# Patient Record
Sex: MALE | Race: WHITE | Employment: UNEMPLOYED | URBAN - METROPOLITAN AREA
[De-identification: names, ages, dates, MRNs, and addresses within clinical notes are randomized per-mention and may not be internally consistent; named-entity substitution may affect disease eponyms.]

---

## 2021-01-01 ENCOUNTER — HOSPITAL ENCOUNTER (EMERGENCY)
Age: 56
Discharge: HOME | End: 2021-01-02
Attending: EMERGENCY MEDICINE
Payer: MEDICAID

## 2021-01-01 VITALS
HEART RATE: 103 BPM | OXYGEN SATURATION: 96 % | HEIGHT: 67 IN | WEIGHT: 198 LBS | RESPIRATION RATE: 20 BRPM | TEMPERATURE: 99.3 F | BODY MASS INDEX: 31.08 KG/M2 | SYSTOLIC BLOOD PRESSURE: 94 MMHG | DIASTOLIC BLOOD PRESSURE: 61 MMHG

## 2021-01-01 DIAGNOSIS — R50.9 FEVER, UNSPECIFIED FEVER CAUSE: Primary | ICD-10-CM

## 2021-01-01 LAB
ANION GAP SERPL CALC-SCNC: 7 MMOL/L (ref 3–18)
APPEARANCE UR: CLEAR
BASOPHILS # BLD: 0 K/UL (ref 0–0.1)
BASOPHILS NFR BLD: 0 % (ref 0–2)
BILIRUB UR QL: NEGATIVE
BUN SERPL-MCNC: 13 MG/DL (ref 7–18)
BUN/CREAT SERPL: 11 (ref 12–20)
CALCIUM SERPL-MCNC: 7.8 MG/DL (ref 8.5–10.1)
CHLORIDE SERPL-SCNC: 97 MMOL/L (ref 100–111)
CO2 SERPL-SCNC: 25 MMOL/L (ref 21–32)
COLOR UR: YELLOW
COVID-19 RAPID TEST, COVR: NOT DETECTED
CREAT SERPL-MCNC: 1.17 MG/DL (ref 0.6–1.3)
DIFFERENTIAL METHOD BLD: ABNORMAL
EOSINOPHIL # BLD: 0.4 K/UL (ref 0–0.4)
EOSINOPHIL NFR BLD: 4 % (ref 0–5)
ERYTHROCYTE [DISTWIDTH] IN BLOOD BY AUTOMATED COUNT: 14 % (ref 11.6–14.5)
GLUCOSE SERPL-MCNC: 314 MG/DL (ref 74–99)
GLUCOSE UR STRIP.AUTO-MCNC: 250 MG/DL
HCT VFR BLD AUTO: 32.6 % (ref 36–48)
HGB BLD-MCNC: 10.3 G/DL (ref 13–16)
HGB UR QL STRIP: NEGATIVE
KETONES UR QL STRIP.AUTO: NEGATIVE MG/DL
LACTATE BLD-SCNC: 2.24 MMOL/L (ref 0.4–2)
LEUKOCYTE ESTERASE UR QL STRIP.AUTO: NEGATIVE
LYMPHOCYTES # BLD: 1.6 K/UL (ref 0.9–3.6)
LYMPHOCYTES NFR BLD: 17 % (ref 21–52)
MAGNESIUM SERPL-MCNC: 2.2 MG/DL (ref 1.6–2.6)
MCH RBC QN AUTO: 28.7 PG (ref 24–34)
MCHC RBC AUTO-ENTMCNC: 31.6 G/DL (ref 31–37)
MCV RBC AUTO: 90.8 FL (ref 74–97)
MONOCYTES # BLD: 0.8 K/UL (ref 0.05–1.2)
MONOCYTES NFR BLD: 8 % (ref 3–10)
NEUTS SEG # BLD: 6.7 K/UL (ref 1.8–8)
NEUTS SEG NFR BLD: 71 % (ref 40–73)
NITRITE UR QL STRIP.AUTO: NEGATIVE
PH UR STRIP: 5.5 [PH] (ref 5–8)
PLATELET # BLD AUTO: 339 K/UL (ref 135–420)
PMV BLD AUTO: 9.5 FL (ref 9.2–11.8)
POTASSIUM SERPL-SCNC: 3.9 MMOL/L (ref 3.5–5.5)
PROT UR STRIP-MCNC: NEGATIVE MG/DL
RBC # BLD AUTO: 3.59 M/UL (ref 4.7–5.5)
SODIUM SERPL-SCNC: 129 MMOL/L (ref 136–145)
SOURCE, COVRS: NORMAL
SP GR UR REFRACTOMETRY: 1.01 (ref 1–1.03)
SPECIMEN TYPE, XMCV1T: NORMAL
UROBILINOGEN UR QL STRIP.AUTO: 0.2 EU/DL (ref 0.2–1)
WBC # BLD AUTO: 9.5 K/UL (ref 4.6–13.2)

## 2021-01-01 PROCEDURE — 83605 ASSAY OF LACTIC ACID: CPT

## 2021-01-01 PROCEDURE — 85025 COMPLETE CBC W/AUTO DIFF WBC: CPT

## 2021-01-01 PROCEDURE — 83735 ASSAY OF MAGNESIUM: CPT

## 2021-01-01 PROCEDURE — 74011250637 HC RX REV CODE- 250/637: Performed by: EMERGENCY MEDICINE

## 2021-01-01 PROCEDURE — 87635 SARS-COV-2 COVID-19 AMP PRB: CPT

## 2021-01-01 PROCEDURE — 87040 BLOOD CULTURE FOR BACTERIA: CPT

## 2021-01-01 PROCEDURE — 74011250636 HC RX REV CODE- 250/636: Performed by: EMERGENCY MEDICINE

## 2021-01-01 PROCEDURE — 81003 URINALYSIS AUTO W/O SCOPE: CPT

## 2021-01-01 PROCEDURE — 80048 BASIC METABOLIC PNL TOTAL CA: CPT

## 2021-01-01 PROCEDURE — 99285 EMERGENCY DEPT VISIT HI MDM: CPT

## 2021-01-01 RX ORDER — OXYCODONE AND ACETAMINOPHEN 5; 325 MG/1; MG/1
1 TABLET ORAL ONCE
Status: COMPLETED | OUTPATIENT
Start: 2021-01-01 | End: 2021-01-01

## 2021-01-01 RX ORDER — ATORVASTATIN CALCIUM 80 MG/1
80 TABLET, FILM COATED ORAL DAILY
COMMUNITY

## 2021-01-01 RX ORDER — QUETIAPINE FUMARATE 400 MG/1
400 TABLET, FILM COATED ORAL EVERY 12 HOURS
COMMUNITY

## 2021-01-01 RX ORDER — GABAPENTIN 800 MG/1
800 TABLET ORAL 4 TIMES DAILY
COMMUNITY

## 2021-01-01 RX ORDER — PANTOPRAZOLE SODIUM 40 MG/1
40 GRANULE, DELAYED RELEASE ORAL 2 TIMES DAILY
COMMUNITY

## 2021-01-01 RX ORDER — ENOXAPARIN SODIUM 100 MG/ML
40 INJECTION SUBCUTANEOUS DAILY
COMMUNITY

## 2021-01-01 RX ORDER — CEFTRIAXONE 1 G/1
2 INJECTION, POWDER, FOR SOLUTION INTRAMUSCULAR; INTRAVENOUS ONCE
COMMUNITY
End: 2021-01-20

## 2021-01-01 RX ORDER — OXYCODONE AND ACETAMINOPHEN 5; 325 MG/1; MG/1
1 TABLET ORAL ONCE
Status: COMPLETED | OUTPATIENT
Start: 2021-01-01 | End: 2021-01-02

## 2021-01-01 RX ORDER — LISINOPRIL 5 MG/1
TABLET ORAL DAILY
COMMUNITY

## 2021-01-01 RX ORDER — FLUOXETINE HYDROCHLORIDE 40 MG/1
80 CAPSULE ORAL DAILY
COMMUNITY

## 2021-01-01 RX ORDER — INSULIN GLARGINE 100 [IU]/ML
45 INJECTION, SOLUTION SUBCUTANEOUS
COMMUNITY
End: 2021-01-20

## 2021-01-01 RX ORDER — INSULIN LISPRO 100 [IU]/ML
6 INJECTION, SOLUTION INTRAVENOUS; SUBCUTANEOUS
COMMUNITY
End: 2021-01-20

## 2021-01-01 RX ORDER — OXYCODONE HYDROCHLORIDE 5 MG/1
10 TABLET ORAL
COMMUNITY
End: 2021-01-20

## 2021-01-01 RX ORDER — DOCUSATE SODIUM 100 MG/1
100 CAPSULE, LIQUID FILLED ORAL
COMMUNITY

## 2021-01-01 RX ADMIN — OXYCODONE HYDROCHLORIDE AND ACETAMINOPHEN 1 TABLET: 5; 325 TABLET ORAL at 20:46

## 2021-01-01 RX ADMIN — SODIUM CHLORIDE 1000 ML: 900 INJECTION, SOLUTION INTRAVENOUS at 19:48

## 2021-01-01 NOTE — ED NOTES
Per Dr Flores Angry- do not initiate sepsis bundle- fluids not indicated- pt already receiving ceftriaxone 2g daily for wound infection treatment
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
## 2021-01-02 PROCEDURE — 74011250637 HC RX REV CODE- 250/637: Performed by: EMERGENCY MEDICINE

## 2021-01-02 RX ADMIN — OXYCODONE HYDROCHLORIDE AND ACETAMINOPHEN 1 TABLET: 5; 325 TABLET ORAL at 00:32

## 2021-01-02 NOTE — DISCHARGE INSTRUCTIONS
Coronavirus test negative. No evidence of urinary tract infection. No wound infection. Blood cultures have been drawn and are pending at [facility].   No leukocytosis

## 2021-01-07 LAB
BACTERIA SPEC CULT: NORMAL
BACTERIA SPEC CULT: NORMAL
SERVICE CMNT-IMP: NORMAL
SERVICE CMNT-IMP: NORMAL

## 2021-01-09 ENCOUNTER — APPOINTMENT (OUTPATIENT)
Dept: CT IMAGING | Age: 56
DRG: 721 | End: 2021-01-09
Attending: STUDENT IN AN ORGANIZED HEALTH CARE EDUCATION/TRAINING PROGRAM
Payer: MEDICAID

## 2021-01-09 ENCOUNTER — APPOINTMENT (OUTPATIENT)
Dept: GENERAL RADIOLOGY | Age: 56
DRG: 721 | End: 2021-01-09
Attending: STUDENT IN AN ORGANIZED HEALTH CARE EDUCATION/TRAINING PROGRAM
Payer: MEDICAID

## 2021-01-09 ENCOUNTER — HOSPITAL ENCOUNTER (INPATIENT)
Age: 56
LOS: 11 days | Discharge: NURSING, REHAB OR LONG-TERM CARE | DRG: 721 | End: 2021-01-20
Attending: STUDENT IN AN ORGANIZED HEALTH CARE EDUCATION/TRAINING PROGRAM | Admitting: HOSPITALIST
Payer: MEDICAID

## 2021-01-09 DIAGNOSIS — A41.9 SEPSIS WITH ACUTE RENAL FAILURE, DUE TO UNSPECIFIED ORGANISM, UNSPECIFIED ACUTE RENAL FAILURE TYPE, UNSPECIFIED WHETHER SEPTIC SHOCK PRESENT (HCC): Primary | ICD-10-CM

## 2021-01-09 DIAGNOSIS — R65.20 SEPSIS WITH ACUTE RENAL FAILURE, DUE TO UNSPECIFIED ORGANISM, UNSPECIFIED ACUTE RENAL FAILURE TYPE, UNSPECIFIED WHETHER SEPTIC SHOCK PRESENT (HCC): Primary | ICD-10-CM

## 2021-01-09 DIAGNOSIS — N17.9 SEPSIS WITH ACUTE RENAL FAILURE, DUE TO UNSPECIFIED ORGANISM, UNSPECIFIED ACUTE RENAL FAILURE TYPE, UNSPECIFIED WHETHER SEPTIC SHOCK PRESENT (HCC): Primary | ICD-10-CM

## 2021-01-09 DIAGNOSIS — T84.7XXD INFECTED HARDWARE IN RIGHT LOWER EXTREMITY, SUBSEQUENT ENCOUNTER: ICD-10-CM

## 2021-01-09 PROBLEM — E87.6 HYPOKALEMIA: Status: ACTIVE | Noted: 2021-01-09

## 2021-01-09 PROBLEM — T80.219A PICC LINE INFECTION: Status: ACTIVE | Noted: 2021-01-09

## 2021-01-09 PROBLEM — E83.42 HYPOMAGNESEMIA: Status: ACTIVE | Noted: 2021-01-09

## 2021-01-09 PROBLEM — R65.21 SEPTIC SHOCK (HCC): Status: ACTIVE | Noted: 2021-01-09

## 2021-01-09 LAB
ANION GAP SERPL CALC-SCNC: 11 MMOL/L (ref 3–18)
APPEARANCE UR: CLEAR
APTT PPP: 46.4 SEC (ref 23–36.4)
ARTERIAL PATENCY WRIST A: NO
ATRIAL RATE: 132 BPM
BASE DEFICIT BLD-SCNC: 6 MMOL/L
BASOPHILS # BLD: 0 K/UL (ref 0–0.1)
BASOPHILS NFR BLD: 0 % (ref 0–2)
BDY SITE: ABNORMAL
BILIRUB UR QL: NEGATIVE
BODY TEMPERATURE: 102.2
BUN SERPL-MCNC: 15 MG/DL (ref 7–18)
BUN/CREAT SERPL: 6 (ref 12–20)
CALCIUM SERPL-MCNC: 8.6 MG/DL (ref 8.5–10.1)
CALCULATED P AXIS, ECG09: 55 DEGREES
CALCULATED R AXIS, ECG10: 16 DEGREES
CALCULATED T AXIS, ECG11: 50 DEGREES
CHLORIDE SERPL-SCNC: 100 MMOL/L (ref 100–111)
CK MB CFR SERPL CALC: ABNORMAL % (ref 0–4)
CK MB SERPL-MCNC: <1 NG/ML (ref 5–25)
CK SERPL-CCNC: 35 U/L (ref 39–308)
CO2 SERPL-SCNC: 24 MMOL/L (ref 21–32)
COLOR UR: YELLOW
COVID-19 RAPID TEST, COVR: NOT DETECTED
CREAT SERPL-MCNC: 2.49 MG/DL (ref 0.6–1.3)
DIAGNOSIS, 93000: NORMAL
DIFFERENTIAL METHOD BLD: ABNORMAL
EOSINOPHIL # BLD: 0 K/UL (ref 0–0.4)
EOSINOPHIL NFR BLD: 1 % (ref 0–5)
ERYTHROCYTE [DISTWIDTH] IN BLOOD BY AUTOMATED COUNT: 14.4 % (ref 11.6–14.5)
EST. AVERAGE GLUCOSE BLD GHB EST-MCNC: 180 MG/DL
FLUAV AG NPH QL IA: NEGATIVE
FLUBV AG NOSE QL IA: NEGATIVE
GAS FLOW.O2 O2 DELIVERY SYS: ABNORMAL L/MIN
GAS FLOW.O2 SETTING OXYMISER: 3.5 L/M
GLUCOSE BLD STRIP.AUTO-MCNC: 181 MG/DL (ref 70–110)
GLUCOSE SERPL-MCNC: 161 MG/DL (ref 74–99)
GLUCOSE UR STRIP.AUTO-MCNC: NEGATIVE MG/DL
HBA1C MFR BLD: 7.9 % (ref 4.2–5.6)
HCO3 BLD-SCNC: 18.2 MMOL/L (ref 22–26)
HCT VFR BLD AUTO: 36.2 % (ref 36–48)
HEALTH STATUS, XMCV2T: NORMAL
HGB BLD-MCNC: 11.1 G/DL (ref 13–16)
HGB UR QL STRIP: NEGATIVE
INR PPP: 1.6 (ref 0.8–1.2)
KETONES UR QL STRIP.AUTO: NEGATIVE MG/DL
LACTATE BLD-SCNC: 2.37 MMOL/L (ref 0.4–2)
LACTATE BLD-SCNC: 2.9 MMOL/L (ref 0.4–2)
LACTATE BLD-SCNC: 6.21 MMOL/L (ref 0.4–2)
LEUKOCYTE ESTERASE UR QL STRIP.AUTO: NEGATIVE
LYMPHOCYTES # BLD: 0.2 K/UL (ref 0.9–3.6)
LYMPHOCYTES NFR BLD: 7 % (ref 21–52)
MAGNESIUM SERPL-MCNC: 1.7 MG/DL (ref 1.6–2.6)
MCH RBC QN AUTO: 27.5 PG (ref 24–34)
MCHC RBC AUTO-ENTMCNC: 30.7 G/DL (ref 31–37)
MCV RBC AUTO: 89.8 FL (ref 74–97)
MONOCYTES # BLD: 0 K/UL (ref 0.05–1.2)
MONOCYTES NFR BLD: 0 % (ref 3–10)
NEUTS SEG # BLD: 2.9 K/UL (ref 1.8–8)
NEUTS SEG NFR BLD: 92 % (ref 40–73)
NITRITE UR QL STRIP.AUTO: NEGATIVE
O2/TOTAL GAS SETTING VFR VENT: 34 %
P-R INTERVAL, ECG05: 122 MS
PCO2 BLD: 30.6 MMHG (ref 35–45)
PH BLD: 7.39 [PH] (ref 7.35–7.45)
PH UR STRIP: 5.5 [PH] (ref 5–8)
PHOSPHATE SERPL-MCNC: 1 MG/DL (ref 2.5–4.9)
PLATELET # BLD AUTO: 231 K/UL (ref 135–420)
PMV BLD AUTO: 9 FL (ref 9.2–11.8)
PO2 BLD: 67 MMHG (ref 80–100)
POTASSIUM SERPL-SCNC: 3.8 MMOL/L (ref 3.5–5.5)
PROT UR STRIP-MCNC: NEGATIVE MG/DL
PROTHROMBIN TIME: 18.3 SEC (ref 11.5–15.2)
Q-T INTERVAL, ECG07: 304 MS
QRS DURATION, ECG06: 80 MS
QTC CALCULATION (BEZET), ECG08: 450 MS
RBC # BLD AUTO: 4.03 M/UL (ref 4.7–5.5)
SAO2 % BLD: 91 % (ref 92–97)
SERVICE CMNT-IMP: ABNORMAL
SODIUM SERPL-SCNC: 135 MMOL/L (ref 136–145)
SOURCE, COVRS: NORMAL
SP GR UR REFRACTOMETRY: 1.01 (ref 1–1.03)
SPECIMEN TYPE, XMCV1T: NORMAL
SPECIMEN TYPE: ABNORMAL
TOTAL RESP. RATE, ITRR: 24
TROPONIN I SERPL-MCNC: <0.02 NG/ML (ref 0–0.04)
UROBILINOGEN UR QL STRIP.AUTO: 0.2 EU/DL (ref 0.2–1)
VENTRICULAR RATE, ECG03: 132 BPM
WBC # BLD AUTO: 3.2 K/UL (ref 4.6–13.2)

## 2021-01-09 PROCEDURE — 65610000006 HC RM INTENSIVE CARE

## 2021-01-09 PROCEDURE — 96368 THER/DIAG CONCURRENT INF: CPT

## 2021-01-09 PROCEDURE — 71045 X-RAY EXAM CHEST 1 VIEW: CPT

## 2021-01-09 PROCEDURE — 87106 FUNGI IDENTIFICATION YEAST: CPT

## 2021-01-09 PROCEDURE — 81003 URINALYSIS AUTO W/O SCOPE: CPT

## 2021-01-09 PROCEDURE — 74011636637 HC RX REV CODE- 636/637: Performed by: HOSPITALIST

## 2021-01-09 PROCEDURE — 74011000258 HC RX REV CODE- 258: Performed by: HOSPITALIST

## 2021-01-09 PROCEDURE — 74011250636 HC RX REV CODE- 250/636: Performed by: STUDENT IN AN ORGANIZED HEALTH CARE EDUCATION/TRAINING PROGRAM

## 2021-01-09 PROCEDURE — 74011000250 HC RX REV CODE- 250: Performed by: HOSPITALIST

## 2021-01-09 PROCEDURE — 85025 COMPLETE CBC W/AUTO DIFF WBC: CPT

## 2021-01-09 PROCEDURE — 87040 BLOOD CULTURE FOR BACTERIA: CPT

## 2021-01-09 PROCEDURE — 73700 CT LOWER EXTREMITY W/O DYE: CPT

## 2021-01-09 PROCEDURE — 36600 WITHDRAWAL OF ARTERIAL BLOOD: CPT

## 2021-01-09 PROCEDURE — 83605 ASSAY OF LACTIC ACID: CPT

## 2021-01-09 PROCEDURE — 96365 THER/PROPH/DIAG IV INF INIT: CPT

## 2021-01-09 PROCEDURE — 85730 THROMBOPLASTIN TIME PARTIAL: CPT

## 2021-01-09 PROCEDURE — 74011000258 HC RX REV CODE- 258: Performed by: STUDENT IN AN ORGANIZED HEALTH CARE EDUCATION/TRAINING PROGRAM

## 2021-01-09 PROCEDURE — 99285 EMERGENCY DEPT VISIT HI MDM: CPT

## 2021-01-09 PROCEDURE — 74011250636 HC RX REV CODE- 250/636: Performed by: HOSPITALIST

## 2021-01-09 PROCEDURE — 87635 SARS-COV-2 COVID-19 AMP PRB: CPT

## 2021-01-09 PROCEDURE — 96366 THER/PROPH/DIAG IV INF ADDON: CPT

## 2021-01-09 PROCEDURE — 83036 HEMOGLOBIN GLYCOSYLATED A1C: CPT

## 2021-01-09 PROCEDURE — 80048 BASIC METABOLIC PNL TOTAL CA: CPT

## 2021-01-09 PROCEDURE — 87070 CULTURE OTHR SPECIMN AEROBIC: CPT

## 2021-01-09 PROCEDURE — 82803 BLOOD GASES ANY COMBINATION: CPT

## 2021-01-09 PROCEDURE — 87804 INFLUENZA ASSAY W/OPTIC: CPT

## 2021-01-09 PROCEDURE — 83735 ASSAY OF MAGNESIUM: CPT

## 2021-01-09 PROCEDURE — 74011250636 HC RX REV CODE- 250/636: Performed by: INTERNAL MEDICINE

## 2021-01-09 PROCEDURE — 84484 ASSAY OF TROPONIN QUANT: CPT

## 2021-01-09 PROCEDURE — 85610 PROTHROMBIN TIME: CPT

## 2021-01-09 PROCEDURE — 51702 INSERT TEMP BLADDER CATH: CPT

## 2021-01-09 PROCEDURE — 74011000250 HC RX REV CODE- 250: Performed by: STUDENT IN AN ORGANIZED HEALTH CARE EDUCATION/TRAINING PROGRAM

## 2021-01-09 PROCEDURE — 74011250637 HC RX REV CODE- 250/637: Performed by: HOSPITALIST

## 2021-01-09 PROCEDURE — 82962 GLUCOSE BLOOD TEST: CPT

## 2021-01-09 PROCEDURE — 84100 ASSAY OF PHOSPHORUS: CPT

## 2021-01-09 PROCEDURE — 93005 ELECTROCARDIOGRAM TRACING: CPT

## 2021-01-09 RX ORDER — ACETAMINOPHEN 325 MG/1
650 TABLET ORAL
Status: DISCONTINUED | OUTPATIENT
Start: 2021-01-09 | End: 2021-01-20 | Stop reason: HOSPADM

## 2021-01-09 RX ORDER — INSULIN LISPRO 100 [IU]/ML
INJECTION, SOLUTION INTRAVENOUS; SUBCUTANEOUS
COMMUNITY
End: 2021-01-20

## 2021-01-09 RX ORDER — ACETAMINOPHEN 650 MG/1
650 SUPPOSITORY RECTAL
Status: DISCONTINUED | OUTPATIENT
Start: 2021-01-09 | End: 2021-01-20 | Stop reason: HOSPADM

## 2021-01-09 RX ORDER — INSULIN GLARGINE 100 [IU]/ML
20 INJECTION, SOLUTION SUBCUTANEOUS
Status: DISCONTINUED | OUTPATIENT
Start: 2021-01-09 | End: 2021-01-11

## 2021-01-09 RX ORDER — SODIUM CHLORIDE 9 MG/ML
150 INJECTION, SOLUTION INTRAVENOUS CONTINUOUS
Status: DISCONTINUED | OUTPATIENT
Start: 2021-01-09 | End: 2021-01-11

## 2021-01-09 RX ORDER — VANCOMYCIN 2 GRAM/500 ML IN 0.9 % SODIUM CHLORIDE INTRAVENOUS
2000 ONCE
Status: COMPLETED | OUTPATIENT
Start: 2021-01-09 | End: 2021-01-09

## 2021-01-09 RX ORDER — SODIUM CHLORIDE 0.9 % (FLUSH) 0.9 %
5-40 SYRINGE (ML) INJECTION AS NEEDED
Status: DISCONTINUED | OUTPATIENT
Start: 2021-01-09 | End: 2021-01-20 | Stop reason: HOSPADM

## 2021-01-09 RX ORDER — ACETAMINOPHEN 325 MG/1
650 TABLET ORAL
COMMUNITY

## 2021-01-09 RX ORDER — QUETIAPINE FUMARATE 100 MG/1
TABLET, FILM COATED ORAL
Status: DISPENSED
Start: 2021-01-09 | End: 2021-01-10

## 2021-01-09 RX ORDER — POTASSIUM CHLORIDE 750 MG/1
40 TABLET, FILM COATED, EXTENDED RELEASE ORAL
Status: DISCONTINUED | OUTPATIENT
Start: 2021-01-09 | End: 2021-01-09

## 2021-01-09 RX ORDER — SODIUM CHLORIDE 0.9 % (FLUSH) 0.9 %
5-40 SYRINGE (ML) INJECTION EVERY 8 HOURS
Status: DISCONTINUED | OUTPATIENT
Start: 2021-01-09 | End: 2021-01-20 | Stop reason: HOSPADM

## 2021-01-09 RX ORDER — ONDANSETRON 2 MG/ML
4 INJECTION INTRAMUSCULAR; INTRAVENOUS
Status: DISCONTINUED | OUTPATIENT
Start: 2021-01-09 | End: 2021-01-20 | Stop reason: HOSPADM

## 2021-01-09 RX ORDER — ENOXAPARIN SODIUM 100 MG/ML
40 INJECTION SUBCUTANEOUS DAILY
Status: DISCONTINUED | OUTPATIENT
Start: 2021-01-10 | End: 2021-01-10

## 2021-01-09 RX ORDER — SODIUM CHLORIDE 0.9 % (FLUSH) 0.9 %
5-10 SYRINGE (ML) INJECTION AS NEEDED
Status: DISCONTINUED | OUTPATIENT
Start: 2021-01-09 | End: 2021-01-11 | Stop reason: SDUPTHER

## 2021-01-09 RX ORDER — POLYETHYLENE GLYCOL 3350 17 G/17G
17 POWDER, FOR SOLUTION ORAL DAILY PRN
Status: DISCONTINUED | OUTPATIENT
Start: 2021-01-09 | End: 2021-01-20 | Stop reason: HOSPADM

## 2021-01-09 RX ORDER — FLUOXETINE HYDROCHLORIDE 20 MG/1
80 CAPSULE ORAL DAILY
Status: DISCONTINUED | OUTPATIENT
Start: 2021-01-10 | End: 2021-01-20 | Stop reason: HOSPADM

## 2021-01-09 RX ORDER — NOREPINEPHRINE BIT/0.9 % NACL 8 MG/250ML
.5-16 INFUSION BOTTLE (ML) INTRAVENOUS
Status: DISCONTINUED | OUTPATIENT
Start: 2021-01-09 | End: 2021-01-12

## 2021-01-09 RX ORDER — PROMETHAZINE HYDROCHLORIDE 25 MG/1
12.5 TABLET ORAL
Status: DISCONTINUED | OUTPATIENT
Start: 2021-01-09 | End: 2021-01-20 | Stop reason: HOSPADM

## 2021-01-09 RX ORDER — MAGNESIUM SULFATE 100 %
4 CRYSTALS MISCELLANEOUS AS NEEDED
Status: DISCONTINUED | OUTPATIENT
Start: 2021-01-09 | End: 2021-01-20 | Stop reason: HOSPADM

## 2021-01-09 RX ORDER — INSULIN GLARGINE 100 [IU]/ML
10 INJECTION, SOLUTION SUBCUTANEOUS
Status: DISCONTINUED | OUTPATIENT
Start: 2021-01-09 | End: 2021-01-09

## 2021-01-09 RX ORDER — QUETIAPINE FUMARATE 200 MG/1
400 TABLET, FILM COATED ORAL EVERY 12 HOURS
Status: DISCONTINUED | OUTPATIENT
Start: 2021-01-09 | End: 2021-01-14

## 2021-01-09 RX ORDER — MAGNESIUM SULFATE HEPTAHYDRATE 40 MG/ML
2 INJECTION, SOLUTION INTRAVENOUS ONCE
Status: COMPLETED | OUTPATIENT
Start: 2021-01-09 | End: 2021-01-09

## 2021-01-09 RX ORDER — INSULIN LISPRO 100 [IU]/ML
INJECTION, SOLUTION INTRAVENOUS; SUBCUTANEOUS
Status: DISCONTINUED | OUTPATIENT
Start: 2021-01-09 | End: 2021-01-20 | Stop reason: HOSPADM

## 2021-01-09 RX ORDER — MAGNESIUM SULFATE HEPTAHYDRATE 500 MG/ML
2 INJECTION, SOLUTION INTRAMUSCULAR; INTRAVENOUS
Status: DISCONTINUED | OUTPATIENT
Start: 2021-01-09 | End: 2021-01-09

## 2021-01-09 RX ORDER — DEXTROSE MONOHYDRATE 25 G/50ML
25-50 INJECTION, SOLUTION INTRAVENOUS AS NEEDED
Status: DISCONTINUED | OUTPATIENT
Start: 2021-01-09 | End: 2021-01-20 | Stop reason: HOSPADM

## 2021-01-09 RX ADMIN — VANCOMYCIN HYDROCHLORIDE 2000 MG: 10 INJECTION, POWDER, LYOPHILIZED, FOR SOLUTION INTRAVENOUS at 11:15

## 2021-01-09 RX ADMIN — SODIUM CHLORIDE 694 ML: 900 INJECTION, SOLUTION INTRAVENOUS at 11:24

## 2021-01-09 RX ADMIN — VASOPRESSIN 0.04 UNITS/MIN: 20 INJECTION INTRAVENOUS at 23:56

## 2021-01-09 RX ADMIN — ACETAMINOPHEN 650 MG: 325 TABLET, FILM COATED ORAL at 19:54

## 2021-01-09 RX ADMIN — NOREPINEPHRINE BITARTRATE 16 MCG/MIN: 1 INJECTION, SOLUTION, CONCENTRATE INTRAVENOUS at 19:48

## 2021-01-09 RX ADMIN — SODIUM CHLORIDE 1000 ML: 900 INJECTION, SOLUTION INTRAVENOUS at 10:52

## 2021-01-09 RX ADMIN — CEFEPIME 2 G: 2 INJECTION, POWDER, FOR SOLUTION INTRAVENOUS at 22:06

## 2021-01-09 RX ADMIN — SODIUM CHLORIDE 1000 ML: 900 INJECTION, SOLUTION INTRAVENOUS at 11:20

## 2021-01-09 RX ADMIN — SODIUM CHLORIDE 150 ML/HR: 900 INJECTION, SOLUTION INTRAVENOUS at 19:45

## 2021-01-09 RX ADMIN — SODIUM CHLORIDE 1000 ML: 900 INJECTION, SOLUTION INTRAVENOUS at 10:50

## 2021-01-09 RX ADMIN — VASOPRESSIN 0.04 UNITS/MIN: 20 INJECTION INTRAVENOUS at 16:55

## 2021-01-09 RX ADMIN — NOREPINEPHRINE BITARTRATE 8 MCG/MIN: 1 INJECTION, SOLUTION, CONCENTRATE INTRAVENOUS at 12:36

## 2021-01-09 RX ADMIN — SODIUM CHLORIDE 10 ML: 9 INJECTION, SOLUTION INTRAMUSCULAR; INTRAVENOUS; SUBCUTANEOUS at 22:11

## 2021-01-09 RX ADMIN — QUETIAPINE FUMARATE 400 MG: 200 TABLET ORAL at 22:30

## 2021-01-09 RX ADMIN — CEFEPIME 2 G: 2 INJECTION, POWDER, FOR SOLUTION INTRAVENOUS at 11:09

## 2021-01-09 RX ADMIN — MAGNESIUM SULFATE HEPTAHYDRATE 2 G: 40 INJECTION, SOLUTION INTRAVENOUS at 17:50

## 2021-01-09 NOTE — ED NOTES
Spoke with Dr Dana Figueroa, states Dr Antonina Frank (intensivist) will drive back to the hospital to replace pt's central line. Once the central line is replaced, Dr Dana Figueroa would like the patient to be placed in 2 point soft wrist restraints in the interest of pt safety. He also asked me to speak with the pharmacy regarding the infusion rates of his pressors as they are temporarily being run through [facility]. States rates may need to be decreased in the interim. Will communicate this to oncoming RN.

## 2021-01-09 NOTE — ED NOTES
Dr. Santos Roles notes -I assumed care of this patient from Dr. Jenae Fried at 3 PM -patient pending CT scan of the lower extremity to evaluate for osteomyelitis. Patient had the hardware removed from his leg during admission at [facility].      Recent Results (from the past 12 hour(s))   CBC WITH AUTOMATED DIFF    Collection Time: 01/09/21 10:45 AM   Result Value Ref Range    WBC 3.2 (L) 4.6 - 13.2 K/uL    RBC 4.03 (L) 4.70 - 5.50 M/uL    HGB 11.1 (L) 13.0 - 16.0 g/dL    HCT 36.2 36.0 - 48.0 %    MCV 89.8 74.0 - 97.0 FL    MCH 27.5 24.0 - 34.0 PG    MCHC 30.7 (L) 31.0 - 37.0 g/dL    RDW 14.4 11.6 - 14.5 %    PLATELET 844 115 - 524 K/uL    MPV 9.0 (L) 9.2 - 11.8 FL    NEUTROPHILS 92 (H) 40 - 73 %    LYMPHOCYTES 7 (L) 21 - 52 %    MONOCYTES 0 (L) 3 - 10 %    EOSINOPHILS 1 0 - 5 %    BASOPHILS 0 0 - 2 %    ABS. NEUTROPHILS 2.9 1.8 - 8.0 K/UL    ABS. LYMPHOCYTES 0.2 (L) 0.9 - 3.6 K/UL    ABS. MONOCYTES 0.0 (L) 0.05 - 1.2 K/UL    ABS. EOSINOPHILS 0.0 0.0 - 0.4 K/UL    ABS.  BASOPHILS 0.0 0.0 - 0.1 K/UL    DF AUTOMATED     METABOLIC PANEL, BASIC    Collection Time: 01/09/21 10:45 AM   Result Value Ref Range    Sodium 135 (L) 136 - 145 mmol/L    Potassium 3.8 3.5 - 5.5 mmol/L    Chloride 100 100 - 111 mmol/L    CO2 24 21 - 32 mmol/L    Anion gap 11 3.0 - 18 mmol/L    Glucose 161 (H) 74 - 99 mg/dL    BUN 15 7.0 - 18 MG/DL    Creatinine 2.49 (H) 0.6 - 1.3 MG/DL    BUN/Creatinine ratio 6 (L) 12 - 20      GFR est AA 33 (L) >60 ml/min/1.73m2    GFR est non-AA 27 (L) >60 ml/min/1.73m2    Calcium 8.6 8.5 - 10.1 MG/DL   CARDIAC PANEL,(CK, CKMB & TROPONIN)    Collection Time: 01/09/21 10:45 AM   Result Value Ref Range    CK - MB <1.0 <3.6 ng/ml    CK-MB Index  0.0 - 4.0 %     CALCULATION NOT PERFORMED WHEN RESULT IS BELOW LINEAR LIMIT    CK 35 (L) 39 - 308 U/L    Troponin-I, QT <0.02 0.0 - 0.045 NG/ML   MAGNESIUM    Collection Time: 01/09/21 10:45 AM   Result Value Ref Range    Magnesium 1.7 1.6 - 2.6 mg/dL   PHOSPHORUS    Collection Time: 01/09/21 10:45 AM   Result Value Ref Range    Phosphorus 1.0 (L) 2.5 - 4.9 MG/DL   PROTHROMBIN TIME + INR    Collection Time: 01/09/21 10:45 AM   Result Value Ref Range    Prothrombin time 18.3 (H) 11.5 - 15.2 sec    INR 1.6 (H) 0.8 - 1.2     PTT    Collection Time: 01/09/21 10:45 AM   Result Value Ref Range    aPTT 46.4 (H) 23.0 - 36.4 SEC   POC LACTIC ACID    Collection Time: 01/09/21 10:46 AM   Result Value Ref Range    Lactic Acid (POC) 6.21 (HH) 0.40 - 2.00 mmol/L   EKG, 12 LEAD, INITIAL    Collection Time: 01/09/21 10:57 AM   Result Value Ref Range    Ventricular Rate 132 BPM    Atrial Rate 132 BPM    P-R Interval 122 ms    QRS Duration 80 ms    Q-T Interval 304 ms    QTC Calculation (Bezet) 450 ms    Calculated P Axis 55 degrees    Calculated R Axis 16 degrees    Calculated T Axis 50 degrees    Diagnosis       Sinus tachycardia  Otherwise normal ECG  No previous ECGs available     SARS-COV-2    Collection Time: 01/09/21 11:30 AM   Result Value Ref Range    Specimen source NP Swab      COVID-19 rapid test Not detected NOTD      Specimen type NP Swab      Health status NP Swab     INFLUENZA A & B AG (RAPID TEST)    Collection Time: 01/09/21 11:30 AM   Result Value Ref Range    Influenza A Antigen Negative NEG      Influenza B Antigen Negative NEG     URINALYSIS W/ RFLX MICROSCOPIC    Collection Time: 01/09/21  1:56 PM   Result Value Ref Range    Color YELLOW      Appearance CLEAR      Specific gravity 1.013 1.005 - 1.030      pH (UA) 5.5 5.0 - 8.0      Protein Negative NEG mg/dL    Glucose Negative NEG mg/dL    Ketone Negative NEG mg/dL    Bilirubin Negative NEG      Blood Negative NEG      Urobilinogen 0.2 0.2 - 1.0 EU/dL    Nitrites Negative NEG      Leukocyte Esterase Negative NEG     POC LACTIC ACID    Collection Time: 01/09/21  3:44 PM   Result Value Ref Range    Lactic Acid (POC) 2.90 (HH) 0.40 - 2.00 mmol/L     XR CHEST PORT   Final Result   IMPRESSION:      No active cardiopulmonary disease. CT LOW EXT RT WO CONT    (Results Pending) Preliminary interpretation by radiology resident -no CT evidence of osteomyelitis noted. Diagnosis:   1. Sepsis with acute renal failure, due to unspecified organism, unspecified acute renal failure type, unspecified whether septic shock present [facility])          Disposition: Admitted    Follow-up Information    None         Patient's Medications   Start Taking    No medications on file   Continue Taking    ACETAMINOPHEN (TYLENOL) 325 MG TABLET    Take 650 mg by mouth every six (6) hours as needed for Pain. ATORVASTATIN (LIPITOR) 80 MG TABLET    Take 80 mg by mouth daily. CEFTRIAXONE (ROCEPHIN) 1 GRAM INJECTION    2 g by IntraVENous route once. IV once a day for wound infection until 1/15/21    DOCUSATE SODIUM (COLACE) 100 MG CAPSULE    Take 100 mg by mouth nightly. ENOXAPARIN (LOVENOX) 40 MG/0.4 ML    40 mg by SubCUTAneous route daily. FLUOXETINE (PROZAC) 40 MG CAPSULE    Take 80 mg by mouth daily. GABAPENTIN (NEURONTIN) 800 MG TABLET    Take 800 mg by mouth four (4) times daily. INSULIN GLARGINE (LANTUS SOLOSTAR U-100 INSULIN) 100 UNIT/ML (3 ML) INPN    45 Units by SubCUTAneous route ACB/HS. INSULIN LISPRO (HUMALOG) 100 UNIT/ML INJECTION    by SubCUTAneous route. SLIDING SCALE    INSULIN LISPRO (HUMALOG) 100 UNIT/ML KWIKPEN    6 Units by SubCUTAneous route Before breakfast, lunch, and dinner. LISINOPRIL (PRINIVIL, ZESTRIL) 5 MG TABLET    Take  by mouth daily. OXYCODONE IR (ROXICODONE) 5 MG IMMEDIATE RELEASE TABLET    Take 10 mg by mouth every six (6) hours as needed for Pain. PANTOPRAZOLE (PROTONIX) 40 MG GRANULES FOR ORAL SUSPENSION    Take 40 mg by mouth two (2) times a day. QUETIAPINE (SEROQUEL) 400 MG TABLET    Take 400 mg by mouth every twelve (12) hours.    These Medications have changed    No medications on file   Stop Taking    No medications on file

## 2021-01-09 NOTE — ED NOTES
Patient's MAP still in the upper 50's and low 60's, Dr Krissy Pino aware, will start 2030 [address]
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
## 2021-01-09 NOTE — ED NOTES
Spoke with Dr. Christian Forrest. States central line needs to be replaced. States he will speak with Dr Jefferson Dixon regarding this.

## 2021-01-09 NOTE — ED NOTES
Spoke with Dr. Chari Miller. States to pull pt's PICC line and send for cultures. Dr Chari Miller was made aware of pt's worsening condition and MAP trending down significantly. States he will order additional meds.
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
## 2021-01-09 NOTE — ED NOTES
Pt was seen on camera getting out of bed. Immediately went to pt's room. Pt has pulled central line with pressors infusing and sitting on trash can defecating. Pt was assisted back to bed. Reconnected to monitors. Pressors connected to peripheral lines. MD paged to make aware of incident and displaced central line.

## 2021-01-09 NOTE — ED TRIAGE NOTES
Assume care of patient, patient was sent in from the [facility], patient here for possible Midline infection, left arm warm to touch and red where Mid Line is, fever, on arrival patient with tachycardia, tachypnea , SOB and fever, patient placed on monitor, ST, breathing 28 breaths/minute, patient placed on droplet precautions, patient is alert and oriented x 4, patients skin hot to touch and dry, normal color, O2 SAT 89% on room air, patient placed on 2L NC and SAT up to 95-98, Patient states that his Covid test at the [facility] was NEGATIVE

## 2021-01-10 ENCOUNTER — APPOINTMENT (OUTPATIENT)
Dept: VASCULAR SURGERY | Age: 56
DRG: 721 | End: 2021-01-10
Attending: HOSPITALIST
Payer: MEDICAID

## 2021-01-10 LAB
ANION GAP SERPL CALC-SCNC: 11 MMOL/L (ref 3–18)
APPEARANCE UR: ABNORMAL
BACTERIA URNS QL MICRO: ABNORMAL /HPF
BASOPHILS # BLD: 0 K/UL (ref 0–0.1)
BASOPHILS NFR BLD: 0 % (ref 0–3)
BILIRUB UR QL: NEGATIVE
BUN SERPL-MCNC: 27 MG/DL (ref 7–18)
BUN/CREAT SERPL: 8 (ref 12–20)
CALCIUM SERPL-MCNC: 7 MG/DL (ref 8.5–10.1)
CHLORIDE SERPL-SCNC: 109 MMOL/L (ref 100–111)
CO2 SERPL-SCNC: 20 MMOL/L (ref 21–32)
COLOR UR: ABNORMAL
CREAT SERPL-MCNC: 3.29 MG/DL (ref 0.6–1.3)
DIFFERENTIAL METHOD BLD: ABNORMAL
EOSINOPHIL # BLD: 0 K/UL (ref 0–0.4)
EOSINOPHIL NFR BLD: 0 % (ref 0–5)
ERYTHROCYTE [DISTWIDTH] IN BLOOD BY AUTOMATED COUNT: 14.8 % (ref 11.6–14.5)
GLUCOSE BLD STRIP.AUTO-MCNC: 135 MG/DL (ref 70–110)
GLUCOSE BLD STRIP.AUTO-MCNC: 157 MG/DL (ref 70–110)
GLUCOSE BLD STRIP.AUTO-MCNC: 218 MG/DL (ref 70–110)
GLUCOSE BLD STRIP.AUTO-MCNC: 228 MG/DL (ref 70–110)
GLUCOSE SERPL-MCNC: 235 MG/DL (ref 74–99)
GLUCOSE UR STRIP.AUTO-MCNC: NEGATIVE MG/DL
GRAN CASTS URNS QL MICRO: ABNORMAL /LPF
HCT VFR BLD AUTO: 28.9 % (ref 36–48)
HGB BLD-MCNC: 8.7 G/DL (ref 13–16)
HGB UR QL STRIP: ABNORMAL
HYALINE CASTS URNS QL MICRO: ABNORMAL /LPF (ref 0–2)
KETONES UR QL STRIP.AUTO: ABNORMAL MG/DL
LACTATE SERPL-SCNC: 1.8 MMOL/L (ref 0.4–2)
LACTATE SERPL-SCNC: 2.2 MMOL/L (ref 0.4–2)
LACTATE SERPL-SCNC: 2.9 MMOL/L (ref 0.4–2)
LACTATE SERPL-SCNC: 3.1 MMOL/L (ref 0.4–2)
LEUKOCYTE ESTERASE UR QL STRIP.AUTO: NEGATIVE
LYMPHOCYTES # BLD: 2.3 K/UL (ref 0.8–3.5)
LYMPHOCYTES NFR BLD: 8 % (ref 20–51)
MCH RBC QN AUTO: 27.5 PG (ref 24–34)
MCHC RBC AUTO-ENTMCNC: 30.1 G/DL (ref 31–37)
MCV RBC AUTO: 91.5 FL (ref 74–97)
METAMYELOCYTES NFR BLD MANUAL: 2 %
MONOCYTES # BLD: 1.4 K/UL (ref 0–1)
MONOCYTES NFR BLD: 5 % (ref 2–9)
MUCOUS THREADS URNS QL MICRO: ABNORMAL /LPF
MYELOCYTES NFR BLD MANUAL: 2 %
NEUTS BAND NFR BLD MANUAL: 20 % (ref 0–5)
NEUTS SEG # BLD: 18.1 K/UL (ref 1.8–8)
NEUTS SEG NFR BLD: 63 % (ref 42–75)
NITRITE UR QL STRIP.AUTO: NEGATIVE
PH UR STRIP: 5 [PH] (ref 5–8)
PLATELET # BLD AUTO: 228 K/UL (ref 135–420)
PMV BLD AUTO: 9.7 FL (ref 9.2–11.8)
POTASSIUM SERPL-SCNC: 5.1 MMOL/L (ref 3.5–5.5)
PROT UR STRIP-MCNC: 30 MG/DL
RBC # BLD AUTO: 3.16 M/UL (ref 4.7–5.5)
RBC #/AREA URNS HPF: ABNORMAL /HPF (ref 0–5)
RBC MORPH BLD: ABNORMAL
SODIUM SERPL-SCNC: 140 MMOL/L (ref 136–145)
SP GR UR REFRACTOMETRY: 1.02 (ref 1–1.03)
UROBILINOGEN UR QL STRIP.AUTO: 0.2 EU/DL (ref 0.2–1)
WAXY CASTS URNS QL MICRO: ABNORMAL /LPF
WBC # BLD AUTO: 28.7 K/UL (ref 4.6–13.2)
WBC URNS QL MICRO: ABNORMAL /HPF (ref 0–4)

## 2021-01-10 PROCEDURE — 74011250637 HC RX REV CODE- 250/637: Performed by: HOSPITALIST

## 2021-01-10 PROCEDURE — 74011250636 HC RX REV CODE- 250/636: Performed by: HOSPITALIST

## 2021-01-10 PROCEDURE — 81001 URINALYSIS AUTO W/SCOPE: CPT

## 2021-01-10 PROCEDURE — 77030005513 HC CATH URETH FOL11 MDII -B

## 2021-01-10 PROCEDURE — 2709999900 HC NON-CHARGEABLE SUPPLY

## 2021-01-10 PROCEDURE — 74011250636 HC RX REV CODE- 250/636

## 2021-01-10 PROCEDURE — 74011636637 HC RX REV CODE- 636/637: Performed by: HOSPITALIST

## 2021-01-10 PROCEDURE — 74011250637 HC RX REV CODE- 250/637: Performed by: INTERNAL MEDICINE

## 2021-01-10 PROCEDURE — 74011000250 HC RX REV CODE- 250: Performed by: HOSPITALIST

## 2021-01-10 PROCEDURE — 74011000258 HC RX REV CODE- 258: Performed by: INTERNAL MEDICINE

## 2021-01-10 PROCEDURE — 93971 EXTREMITY STUDY: CPT

## 2021-01-10 PROCEDURE — 85025 COMPLETE CBC W/AUTO DIFF WBC: CPT

## 2021-01-10 PROCEDURE — 80048 BASIC METABOLIC PNL TOTAL CA: CPT

## 2021-01-10 PROCEDURE — 74011000258 HC RX REV CODE- 258: Performed by: HOSPITALIST

## 2021-01-10 PROCEDURE — 74011250636 HC RX REV CODE- 250/636: Performed by: INTERNAL MEDICINE

## 2021-01-10 PROCEDURE — 82962 GLUCOSE BLOOD TEST: CPT

## 2021-01-10 PROCEDURE — 85730 THROMBOPLASTIN TIME PARTIAL: CPT

## 2021-01-10 PROCEDURE — C9113 INJ PANTOPRAZOLE SODIUM, VIA: HCPCS | Performed by: HOSPITALIST

## 2021-01-10 PROCEDURE — 65610000006 HC RM INTENSIVE CARE

## 2021-01-10 PROCEDURE — 83605 ASSAY OF LACTIC ACID: CPT

## 2021-01-10 RX ORDER — VANCOMYCIN/0.9 % SOD CHLORIDE 1.5G/250ML
1500 PLASTIC BAG, INJECTION (ML) INTRAVENOUS
Status: DISCONTINUED | OUTPATIENT
Start: 2021-01-10 | End: 2021-01-11

## 2021-01-10 RX ORDER — HEPARIN SODIUM 1000 [USP'U]/ML
80 INJECTION, SOLUTION INTRAVENOUS; SUBCUTANEOUS ONCE
Status: COMPLETED | OUTPATIENT
Start: 2021-01-10 | End: 2021-01-10

## 2021-01-10 RX ORDER — HEPARIN SODIUM 10000 [USP'U]/100ML
INJECTION, SOLUTION INTRAVENOUS
Status: COMPLETED
Start: 2021-01-10 | End: 2021-01-10

## 2021-01-10 RX ORDER — IBUPROFEN 200 MG
1 TABLET ORAL DAILY
Status: DISCONTINUED | OUTPATIENT
Start: 2021-01-10 | End: 2021-01-20 | Stop reason: HOSPADM

## 2021-01-10 RX ORDER — OXYCODONE AND ACETAMINOPHEN 5; 325 MG/1; MG/1
1 TABLET ORAL
Status: DISCONTINUED | OUTPATIENT
Start: 2021-01-10 | End: 2021-01-20 | Stop reason: HOSPADM

## 2021-01-10 RX ORDER — HEPARIN SODIUM 10000 [USP'U]/100ML
18-36 INJECTION, SOLUTION INTRAVENOUS
Status: DISCONTINUED | OUTPATIENT
Start: 2021-01-10 | End: 2021-01-13

## 2021-01-10 RX ADMIN — QUETIAPINE FUMARATE 400 MG: 200 TABLET ORAL at 10:31

## 2021-01-10 RX ADMIN — VASOPRESSIN 0.02 UNITS/MIN: 20 INJECTION INTRAVENOUS at 17:27

## 2021-01-10 RX ADMIN — SODIUM CHLORIDE 10 ML: 9 INJECTION, SOLUTION INTRAMUSCULAR; INTRAVENOUS; SUBCUTANEOUS at 17:19

## 2021-01-10 RX ADMIN — CEFEPIME 2 G: 2 INJECTION, POWDER, FOR SOLUTION INTRAVENOUS at 10:36

## 2021-01-10 RX ADMIN — HEPARIN SODIUM AND DEXTROSE 18 UNITS/KG/HR: 10000; 5 INJECTION INTRAVENOUS at 17:43

## 2021-01-10 RX ADMIN — NOREPINEPHRINE BITARTRATE 16 MCG/MIN: 1 INJECTION, SOLUTION, CONCENTRATE INTRAVENOUS at 02:42

## 2021-01-10 RX ADMIN — SODIUM CHLORIDE 150 ML/HR: 900 INJECTION, SOLUTION INTRAVENOUS at 17:27

## 2021-01-10 RX ADMIN — HEPARIN SODIUM 7220 UNITS: 1000 INJECTION INTRAVENOUS; SUBCUTANEOUS at 20:45

## 2021-01-10 RX ADMIN — SODIUM CHLORIDE 150 ML/HR: 900 INJECTION, SOLUTION INTRAVENOUS at 10:26

## 2021-01-10 RX ADMIN — OXYCODONE AND ACETAMINOPHEN 1 TABLET: 5; 325 TABLET ORAL at 18:05

## 2021-01-10 RX ADMIN — SODIUM CHLORIDE 40 MG: 9 INJECTION INTRAMUSCULAR; INTRAVENOUS; SUBCUTANEOUS at 09:11

## 2021-01-10 RX ADMIN — QUETIAPINE FUMARATE 400 MG: 200 TABLET ORAL at 21:29

## 2021-01-10 RX ADMIN — ACETAMINOPHEN 650 MG: 325 TABLET, FILM COATED ORAL at 21:31

## 2021-01-10 RX ADMIN — OXYCODONE AND ACETAMINOPHEN 1 TABLET: 5; 325 TABLET ORAL at 23:51

## 2021-01-10 RX ADMIN — SODIUM CHLORIDE 10 ML: 9 INJECTION, SOLUTION INTRAMUSCULAR; INTRAVENOUS; SUBCUTANEOUS at 09:08

## 2021-01-10 RX ADMIN — CEFEPIME 2 G: 2 INJECTION, POWDER, FOR SOLUTION INTRAVENOUS at 22:40

## 2021-01-10 RX ADMIN — VANCOMYCIN HYDROCHLORIDE 1500 MG: 10 INJECTION, POWDER, LYOPHILIZED, FOR SOLUTION INTRAVENOUS at 23:58

## 2021-01-10 RX ADMIN — ENOXAPARIN SODIUM 40 MG: 40 INJECTION SUBCUTANEOUS at 09:11

## 2021-01-10 RX ADMIN — SODIUM CHLORIDE 500 ML: 900 INJECTION, SOLUTION INTRAVENOUS at 09:08

## 2021-01-10 RX ADMIN — SODIUM CHLORIDE 150 ML/HR: 900 INJECTION, SOLUTION INTRAVENOUS at 02:49

## 2021-01-10 RX ADMIN — INSULIN LISPRO 4 UNITS: 100 INJECTION, SOLUTION INTRAVENOUS; SUBCUTANEOUS at 12:57

## 2021-01-10 RX ADMIN — SODIUM CHLORIDE 10 ML: 9 INJECTION, SOLUTION INTRAMUSCULAR; INTRAVENOUS; SUBCUTANEOUS at 22:00

## 2021-01-10 NOTE — PROGRESS NOTES
Internal Medicine Progress Note    Patient's Name: Zeferino Ace  Admit Date: 1/9/2021  Length of Stay: 1      Assessment/Plan     Active Hospital Problems    Diagnosis Date Noted    PICC line infection 01/09/2021    Septic shock ([facility] 01/09/2021    Acute renal failure (ARF) ([facility] 01/09/2021    Hypomagnesemia 01/09/2021    Type 2 diabetes mellitus (HCC)     Schizoaffective disorder, bipolar type ([facility]     Hyperlipidemia     Infected hardware in right leg (HCC)     Lactic acidosis      - Febrile overnight with Tmax 102.5 has not had fever now since 2300, WBC's up to 28.7  - Lactate up slightly  - Cont aggressive IVFs  - Cont broad spec IVAB  - Cult cath tip pending  - Blood cult NGTD  - Titrate off pressors as able  - Appreciate ID  - Pulm following as well  - Cr trending up, consult nephro  - Cont acceptable home medications for chronic conditions   - DVT protocol    I have personally reviewed all pertinent labs and films that have officially resulted over the last 24 hours. I have personally checked for all pending labs that are awaiting final results.     Subjective     Pt s/e @ bedside  No major events overnight  On IV levo (trending down) and vaso  Denies CP or SOB  C/o RLE pain only    Objective     Visit Vitals  /79   Pulse 91   Temp 97.7 °F (36.5 °C)   Resp 14   Ht 5' 7\" (1.702 m)   Wt 90.2 kg (198 lb 12.8 oz)   SpO2 94%   BMI 31.14 kg/m²       Physical Exam:  General Appearance: NAD, conversant  Lungs: CTA with normal respiratory effort  CV: RRR, no m/r/g  Abdomen: soft, non-tender, normal bowel sounds  Extremities: no cyanosis, RLE TTP around shin  Neuro: No focal deficits, motor/sensory intact    Lab/Data Reviewed:  CMP:   Lab Results   Component Value Date/Time     01/10/2021 04:11 AM    K 5.1 01/10/2021 04:11 AM     01/10/2021 04:11 AM    CO2 20 (L) 01/10/2021 04:11 AM    AGAP 11 01/10/2021 04:11 AM     (H) 01/10/2021 04:11 AM    BUN 27 (H) 01/10/2021 04:11 AM    CREA 3.29 (H) 01/10/2021 04:11 AM    GFRAA 24 (L) 01/10/2021 04:11 AM    GFRNA 20 (L) 01/10/2021 04:11 AM    CA 7.0 (L) 01/10/2021 04:11 AM     CBC:   Lab Results   Component Value Date/Time    WBC 28.7 (H) 01/10/2021 04:11 AM    HGB 8.7 (L) 01/10/2021 04:11 AM    HCT 28.9 (L) 01/10/2021 04:11 AM     01/10/2021 04:11 AM       Imaging Reviewed:  [facility] Chest Port    Result Date: 1/9/2021  EXAM: XR CHEST PORT CLINICAL INDICATION/HISTORY: SOB   > Additional: None. COMPARISON: None. TECHNIQUE: Portable chest _______________ FINDINGS: SUPPORT DEVICES: None. HEART AND MEDIASTINUM: Normal size and contour. Normal pulmonary vasculature. LUNGS AND PLEURAL SPACES: The lungs are well expanded and clear. No focal consolidation, effusion, or pneumothorax. BONY THORAX AND SOFT TISSUES: No acute osseous abnormality. _______________     IMPRESSION: No active cardiopulmonary disease.        Medications Reviewed:  Current Facility-Administered Medications   Medication Dose Route Frequency    sodium chloride (NS) flush 5-10 mL  5-10 mL IntraVENous PRN    cefepime (MAXIPIME) 2 g in 0.9% sodium chloride (MBP/ADV) 100 mL MBP  2 g IntraVENous Q12H    NOREPINephrine (LEVOPHED) 8 mg in 0.9% NS 250ml infusion  0.5-16 mcg/min IntraVENous TITRATE    FLUoxetine (PROzac) capsule 80 mg  80 mg Oral DAILY    QUEtiapine (SEROquel) tablet 400 mg  400 mg Oral Q12H    vasopressin (VASOSTRICT) 20 Units in 0.9% sodium chloride 100 mL infusion  0.02 Units/min IntraVENous CONTINUOUS    sodium chloride (NS) flush 5-40 mL  5-40 mL IntraVENous Q8H    sodium chloride (NS) flush 5-40 mL  5-40 mL IntraVENous PRN    acetaminophen (TYLENOL) tablet 650 mg  650 mg Oral Q6H PRN    Or    acetaminophen (TYLENOL) suppository 650 mg  650 mg Rectal Q6H PRN    polyethylene glycol (MIRALAX) packet 17 g  17 g Oral DAILY PRN    promethazine (PHENERGAN) tablet 12.5 mg  12.5 mg Oral Q6H PRN    Or    ondansetron (ZOFRAN) injection 4 mg  4 mg IntraVENous Q6H PRN    enoxaparin (LOVENOX) injection 40 mg  40 mg SubCUTAneous DAILY   • pantoprazole (PROTONIX) 40 mg in 0.9% sodium chloride 10 mL injection  40 mg IntraVENous DAILY   • 0.9% sodium chloride infusion  150 mL/hr IntraVENous CONTINUOUS   • insulin lispro (HUMALOG) injection   SubCUTAneous AC&HS   • glucose chewable tablet 16 g  4 Tab Oral PRN   • glucagon (GLUCAGEN) injection 1 mg  1 mg IntraMUSCular PRN   • dextrose (D50) infusion 12.5-25 g  25-50 mL IntraVENous PRN   • insulin glargine (LANTUS) injection 20 Units  20 Units SubCUTAneous QHS           Faizan Bernal, DO  Internal Medicine, Hospitalist  Pager: [phone]  [facility]

## 2021-01-10 NOTE — ROUTINE PROCESS
Arrived from ED. Left groin cvl. Levophed/vasopressin/MIVF infusing. Kline in place. PIV RAC. Oriented to room/ call bell in place 
 
0000  VSS. No changes. 0100 kline cath discontinued 0400  VSS. No changes. Labs drawn Bedside shift change report given to gloria (oncoming nurse) by Damaris Thayer RN 
 (offgoing nurse). Report included the following information SBAR, MAR and Recent Results.

## 2021-01-10 NOTE — PROGRESS NOTES
[facility] Progress Note                                              I have reviewed the flowsheet and previous days notes. Events, vitals, medications and notes from last 24 hours reviewed. Care plan discussed with staff and on multidisciplinary rounds. Subjective:  1/10/2021   Patient reports he is doing better than yesterday. He complains of right lower extremity pain. No complaints of shortness of breath or chest pain    Impression and Plan  Patient Active Problem List   Diagnosis Code    PICC line infection T80.219A    Septic shock ([address] A41.9, R65.21    Type 2 diabetes mellitus ([address] E11.9    Schizoaffective disorder, bipolar type ([address] F25.0    Hyperlipidemia E78.5    Infected hardware in right leg ([address] T84. 7XXA    Lactic acidosis E87.2    Acute renal failure (ARF) (HCC) N17.9    Hypomagnesemia E83.42     Septic shock -patient has received IV fluid boluses as well as normal saline drip. Is currently on both Levophed and vasopressin though they are both coming down. Titrate pressors to keep MAP more than 65  Influenza screen is negative, rapid COVID-19 test is negative, Follow-up on culture results  Lactic acidosis  - patient's lactic acid level is coming down but is a still elevated. Continue with hydration and monitor  PICC line infection -patient's PICC line has been removed on 1/9/2021 and the tip has been sent for cultures. Patient's WBC count is very elevated and also has significant bandemia. Patient is on cefepime and vancomycin. ID has reviewed the chart and agree with antibiotics. Follow-up on the culture results and adjust antibiotics accordingly  Acute kidney injury -patient's creatinine has gone up further today. Secondary to shock, ATN, dehydration. Continue with hydration and monitor  Anemiapatient's hemoglobin has come down from yesterday, possibly secondary to hydration and IV fluids. No overt bleeding at this time.   Continue to monitor  Right lower extremity hardware infection status post removal -CT of the right lower extremity shows possible cellulitis, no specific findings of acute osteomyelitis. Diabetes mellitus type 2 -patient is on Lantus and insulin sliding scale  Lung nodule -patient's CT scan of chest on 12/2/2020 active her side shows Multiple subcentimeter noncalcified pulmonary nodules bilaterally, majority of which are stable in size.  The largest is in the right upper lobe peripherally, and measures 6 x 7 x 8 mm, previously measuring 7 mm maximally. Patient was recommended to follow-up with pulmonary as an outpatient as well as get a CT follow-up in 3 months. Patient is a scheduled for appointment with pulmonologist Dr. Rm Kuhn  Pain controlpatient requests oxycodone for pain control of his right lower extremity pain. Patient was on oxycodone as an outpatient. I will start him on low-dose Percocet  DVT and GI prophylaxispatient is on Lovenox and Protonix    OTHER:  Glycemic Control. Glucose stabilizer per ICU protocol when on insulin drip. Maintain blood glucose 140-180. Replace electrolytes per ICU electrolyte replacement protocol  HOB >=30 degree elevation all the time. Aggressive pulmonary toileting. Incentive spirometry when appropriate. Aspiration precautions. Sepsis bundle and protocol followed. Deescalate antibiotic when appropriate. Vasopressor when appropriate with MAP goal >65 mmHg. Central Line bundle followed, remove when not needed. Large bore IV line or CVP when appropriate. PT/OT eval and treat. OOB/IS when appropriate. Quality Care: Stress ulcer prophylaxis, DVT prophylaxis, HOB elevated, Infection control all reviewed and addressed. Events and notes from last 24 hours reviewed. Care plan discussed with nursing. CC TIME: >45 min     Medication Reviewed:     Allergies   Allergen Reactions    Aspirin Rash    Cheese Unknown (comments)    Codeine Rash      Past Medical History:   Diagnosis Date    Anxiety     Effusion, right knee     GERD (gastroesophageal reflux disease)     Hyperlipidemia     Hypertension     Infection and inflammatory reaction due to internal orthopedic device, implant, and graft ([facility]     Infection following a procedure, organ and space surgical site, subsequent encounter     Major depressive disorder     PTSD (post-traumatic stress disorder)     Schizoaffective disorder, bipolar type ([facility]     Type 2 diabetes mellitus ([facility]       History reviewed. No pertinent surgical history. Social History     Tobacco Use    Smoking status: Current Every Day Smoker     Packs/day: 0.25     Years: 40.00     Pack years: 10.00    Smokeless tobacco: Never Used   Substance Use Topics    Alcohol use: Yes     Comment: socially      History reviewed. No pertinent family history. Prior to Admission medications    Medication Sig Start Date End Date Taking? Authorizing Provider   acetaminophen (TylenoL) 325 mg tablet Take 650 mg by mouth every six (6) hours as needed for Pain. Yes Other, MD Pasha   insulin lispro (HUMALOG) 100 unit/mL injection by SubCUTAneous route. SLIDING SCALE   Yes Pasha Salas MD   cefTRIAXone (ROCEPHIN) 1 gram injection 2 g by IntraVENous route once. IV once a day for wound infection until 1/15/21   Yes Maritza, MD Pasha   docusate sodium (COLACE) 100 mg capsule Take 100 mg by mouth nightly. Yes Maritza, MD Pasha   insulin lispro (HUMALOG) 100 unit/mL kwikpen 6 Units by SubCUTAneous route Before breakfast, lunch, and dinner. Yes Maritza, MD Pasha   insulin glargine (Lantus Solostar U-100 Insulin) 100 unit/mL (3 mL) inpn 45 Units by SubCUTAneous route ACB/HS. Yes Maritza, MD Pasha   atorvastatin (Lipitor) 80 mg tablet Take 80 mg by mouth daily. Yes Maritza, MD Pasha   lisinopriL (PRINIVIL, ZESTRIL) 5 mg tablet Take  by mouth daily. Yes Pasha Salas MD   enoxaparin (Lovenox) 40 mg/0.4 mL 40 mg by SubCUTAneous route daily.    Yes Pasha Salas MD   gabapentin (Neurontin) 800 mg tablet Take 800 mg by mouth four (4) times daily. Yes Maritza, MD Pasha   oxyCODONE IR (ROXICODONE) 5 mg immediate release tablet Take 10 mg by mouth every six (6) hours as needed for Pain. Yes Maritza, MD Pasha   pantoprazole (Protonix) 40 mg granules for oral suspension Take 40 mg by mouth two (2) times a day. Yes Pasha Salas MD   FLUoxetine (PROzac) 40 mg capsule Take 80 mg by mouth daily. Yes Pasha Salas MD   QUEtiapine (SEROqueL) 400 mg tablet Take 400 mg by mouth every twelve (12) hours.    Yes Maritza, MD Pasha     Current Facility-Administered Medications   Medication Dose Route Frequency    heparin 25,000 units in D5W 250 ml 25,000 unit/250 mL(100 unit/mL) infusion        vasopressin (VASOSTRICT) 20 Units in 0.9% sodium chloride 100 mL infusion  0-0.03 Units/min IntraVENous TITRATE    VANCOMYCIN INFORMATION NOTE   Other Rx Dosing/Monitoring    vancomycin (VANCOCIN) 1500 mg in  ml infusion  1,500 mg IntraVENous Q36H    [START ON 1/12/2021] VANCOMYCIN INFORMATION NOTE   Other ONCE    nicotine (NICODERM CQ) 14 mg/24 hr patch 1 Patch  1 Patch TransDERmal DAILY    heparin (porcine) 1,000 unit/mL injection 7,220 Units  80 Units/kg IntraVENous ONCE    heparin 25,000 units in D5W 250 ml infusion  18-36 Units/kg/hr IntraVENous TITRATE    cefepime (MAXIPIME) 2 g in 0.9% sodium chloride (MBP/ADV) 100 mL MBP  2 g IntraVENous Q12H    NOREPINephrine (LEVOPHED) 8 mg in 0.9% NS 250ml infusion  0.5-16 mcg/min IntraVENous TITRATE    FLUoxetine (PROzac) capsule 80 mg  80 mg Oral DAILY    QUEtiapine (SEROquel) tablet 400 mg  400 mg Oral Q12H    sodium chloride (NS) flush 5-40 mL  5-40 mL IntraVENous Q8H    pantoprazole (PROTONIX) 40 mg in 0.9% sodium chloride 10 mL injection  40 mg IntraVENous DAILY    0.9% sodium chloride infusion  150 mL/hr IntraVENous CONTINUOUS    insulin lispro (HUMALOG) injection   SubCUTAneous AC&HS    insulin glargine (LANTUS) injection 20 Units  20 Units SubCUTAneous QHS Lines: All central lines examined by me. No signs of erythema, induration, discharge. Central Venous Catheter:  Triple Lumen 21 Left Other(comment) (Active)   Site Assessment Clean, dry, & intact 01/10/21 0400   Infiltration Assessment 0 01/10/21 0400     Peripheral Intravenous Line:  Peripheral IV 21 Right Antecubital (Active)   Site Assessment Clean, dry, & intact 01/10/21 0400   Phlebitis Assessment 0 01/10/21 0400   Infiltration Assessment 0 01/10/21 0400     Objective:  Vital Signs:    Visit Vitals  BP 93/64   Pulse (!) 105   Temp 97.4 °F (36.3 °C)   Resp 19   Ht 5' 7\" (1.702 m)   Wt 90.2 kg (198 lb 12.8 oz)   SpO2 96%   BMI 31.14 kg/m²      O2 Device: Room air  O2 Flow Rate (L/min): 2 l/min  Temp (24hrs), Av.7 °F (38.2 °C), Min:97.4 °F (36.3 °C), Max:102.5 °F (39.2 °C)      Intake/Output:   Last shift:      01/10 07 - 01/10 190  In: 797.3 [I.V.:797.3]  Out: 1100 [Urine:1100]    Last 3 shifts: 1901 - 01/10 0700  In: 2561.7 [I.V.:2561.7]  Out: 300 [Urine:300]      Intake/Output Summary (Last 24 hours) at 1/10/2021 1725  Last data filed at 1/10/2021 1544  Gross per 24 hour   Intake 3359.05 ml   Output 1400 ml   Net 1959.05 ml       Last 3 Recorded Weights in this Encounter    21 1030 21 1104 21 2330   Weight: 81.6 kg (180 lb) 99.8 kg (220 lb) 90.2 kg (198 lb 12.8 oz)     Physical Exam:     General/Neurology: Alert, Awake,   Head:   Normocephalic, without obvious abnormality  Eye:   PERRL, EOM intact, no scleral icterus, no pallor  Oral:   Mucus membranes moist  Neck:   Supple  Lung:   B/l air entry is fair  Heart:   Regular rate & rhythm. S1 S2 present.    Abdomen/: Soft, non tender, BS +nt  Extremities:  Right lower extremity scab is dry      Data:      Recent Results (from the past 24 hour(s))   POC LACTIC ACID    Collection Time: 21  9:04 PM   Result Value Ref Range    Lactic Acid (POC) 2.37 (HH) 0.40 - 2.00 mmol/L   GLUCOSE, POC    Collection Time: 01/09/21 10:09 PM   Result Value Ref Range    Glucose (POC) 181 (H) 70 - 029 mg/dL   METABOLIC PANEL, BASIC    Collection Time: 01/10/21  4:11 AM   Result Value Ref Range    Sodium 140 136 - 145 mmol/L    Potassium 5.1 3.5 - 5.5 mmol/L    Chloride 109 100 - 111 mmol/L    CO2 20 (L) 21 - 32 mmol/L    Anion gap 11 3.0 - 18 mmol/L    Glucose 235 (H) 74 - 99 mg/dL    BUN 27 (H) 7.0 - 18 MG/DL    Creatinine 3.29 (H) 0.6 - 1.3 MG/DL    BUN/Creatinine ratio 8 (L) 12 - 20      GFR est AA 24 (L) >60 ml/min/1.73m2    GFR est non-AA 20 (L) >60 ml/min/1.73m2    Calcium 7.0 (L) 8.5 - 10.1 MG/DL   CBC WITH AUTOMATED DIFF    Collection Time: 01/10/21  4:11 AM   Result Value Ref Range    WBC 28.7 (H) 4.6 - 13.2 K/uL    RBC 3.16 (L) 4.70 - 5.50 M/uL    HGB 8.7 (L) 13.0 - 16.0 g/dL    HCT 28.9 (L) 36.0 - 48.0 %    MCV 91.5 74.0 - 97.0 FL    MCH 27.5 24.0 - 34.0 PG    MCHC 30.1 (L) 31.0 - 37.0 g/dL    RDW 14.8 (H) 11.6 - 14.5 %    PLATELET 735 520 - 315 K/uL    MPV 9.7 9.2 - 11.8 FL    NEUTROPHILS 63 42 - 75 %    BAND NEUTROPHILS 20 (H) 0 - 5 %    LYMPHOCYTES 8 (L) 20 - 51 %    MONOCYTES 5 2 - 9 %    EOSINOPHILS 0 0 - 5 %    BASOPHILS 0 0 - 3 %    METAMYELOCYTES 2 (H) 0 %    MYELOCYTES 2 (H) 0 %    ABS. NEUTROPHILS 18.1 (H) 1.8 - 8.0 K/UL    ABS. LYMPHOCYTES 2.3 0.8 - 3.5 K/UL    ABS. MONOCYTES 1.4 (H) 0 - 1.0 K/UL    ABS. EOSINOPHILS 0.0 0.0 - 0.4 K/UL    ABS.  BASOPHILS 0.0 0.0 - 0.1 K/UL    RBC COMMENTS ANISOCYTOSIS  1+        DF MANUAL     LACTIC ACID    Collection Time: 01/10/21  4:11 AM   Result Value Ref Range    Lactic acid 3.1 (HH) 0.4 - 2.0 MMOL/L   GLUCOSE, POC    Collection Time: 01/10/21  8:29 AM   Result Value Ref Range    Glucose (POC) 228 (H) 70 - 110 mg/dL   LACTIC ACID    Collection Time: 01/10/21  8:34 AM   Result Value Ref Range    Lactic acid 2.9 (HH) 0.4 - 2.0 MMOL/L   URINALYSIS W/ RFLX MICROSCOPIC    Collection Time: 01/10/21 12:40 PM   Result Value Ref Range    Color DARK YELLOW      Appearance CLOUDY Specific gravity 1.018 1.005 - 1.030      pH (UA) 5.0 5.0 - 8.0      Protein 30 (A) NEG mg/dL    Glucose Negative NEG mg/dL    Ketone TRACE (A) NEG mg/dL    Bilirubin Negative NEG      Blood TRACE (A) NEG      Urobilinogen 0.2 0.2 - 1.0 EU/dL    Nitrites Negative NEG      Leukocyte Esterase Negative NEG     URINE MICROSCOPIC ONLY    Collection Time: 01/10/21 12:40 PM   Result Value Ref Range    WBC 0 to 3 0 - 4 /hpf    RBC 0 to 1 0 - 5 /hpf    Bacteria 1+ (A) NEG /hpf    Mucus FEW (A) NEG /lpf    Hyaline cast 0 to 3 0 - 2 /lpf    Granular cast 4 to 10 NEG /lpf    Waxy cast 0 to 3 NEG /lpf   GLUCOSE, POC    Collection Time: 01/10/21 12:52 PM   Result Value Ref Range    Glucose (POC) 218 (H) 70 - 110 mg/dL   LACTIC ACID    Collection Time: 01/10/21  2:07 PM   Result Value Ref Range    Lactic acid 2.2 (HH) 0.4 - 2.0 MMOL/L   GLUCOSE, POC    Collection Time: 01/10/21  5:14 PM   Result Value Ref Range    Glucose (POC) 135 (H) 70 - 110 mg/dL         Chemistry   Recent Labs     01/10/21  0411 01/09/21  1045   * 161*    135*   K 5.1 3.8    100   CO2 20* 24   BUN 27* 15   CREA 3.29* 2.49*   CA 7.0* 8.6   MG  --  1.7   PHOS  --  1.0*   AGAP 11 11   BUCR 8* 6*       CBC w/Diff   Recent Labs     01/10/21  0411 01/09/21  1045   WBC 28.7* 3.2*   RBC 3.16* 4.03*   HGB 8.7* 11.1*   HCT 28.9* 36.2    231   GRANS 63 92*   LYMPH 8* 7*   EOS 0 1       ABG    Recent Labs     01/09/21  1713   PHI 7.39   PCO2I 30.6*   PO2I 67*   HCO3I 18.2*   FIO2I 34     XR (Most Recent). CXR reviewed by me and compared with previous CXR   Results from Hospital Encounter encounter on 01/09/21   XR CHEST PORT    Narrative EXAM: XR CHEST PORT    CLINICAL INDICATION/HISTORY: SOB    > Additional: None. COMPARISON: None. TECHNIQUE: Portable chest    _______________    FINDINGS:    SUPPORT DEVICES: None. HEART AND MEDIASTINUM: Normal size and contour. Normal pulmonary vasculature.      LUNGS AND PLEURAL SPACES: The lungs are well expanded and clear. No focal  consolidation, effusion, or pneumothorax. BONY THORAX AND SOFT TISSUES: No acute osseous abnormality. _______________      Impression IMPRESSION:    No active cardiopulmonary disease. High complexity decision making was performed during the evaluation of this patient at high risk for decompensation with multiple organ involvement     Above mentioned total time spent on reviewing the case/medical record/data/notes/EMR/patient examination/documentation/coordinating care with nurse/consultants, exclusive of procedures with complex decision making performed and > 50% time spent in face to face evaluation. This note has been dictated using the dragon dictation system    Bao Ospina MD  1/10/2021     Addendum 5:25 PM -patient's left upper extremity ultrasound shows an acute occlusive thrombus in the left subclavian and axillary vein, none occlusive thrombus in 1 of 2 left brachial vein, acute occlusive thrombus in the left basilic upper arm vein. This is likely DVT associated with the PICC line. The PICC line has already been removed. This order was placed yesterday though the ultrasound was done today. Based on these results I will start him on heparin drip per DVT protocol.   Patient's creatinine is elevated so we will change him from Lovenox to heparin drip

## 2021-01-10 NOTE — PROGRESS NOTES
PHYSICAL THERAPY NOTE    13:17PM  New PT Order noted. Chart reviewed. Patient noted to be on complete bedrest.  Will hold off PT Eval until patient off complete bedrest.  Thank you for this referral.    14:49PM  Chart reviewed. Patient still on complete bedrest. Will check back on patient tomorrow. Carroll Cárdenas.  Silvino Barnes
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
## 2021-01-10 NOTE — PROGRESS NOTES
Problem: Discharge Planning  Goal: *Discharge to safe environment  Outcome: Progressing Towards Goal   Plan return to [facility]    Reason for Admission:   Septic shock                  RUR Score:     21%             PCP: First and Last name:  [place]   Name of Practice:    Are you a current patient: Yes/No: yes   Approximate date of last visit: ?   Can you participate in a virtual visit if needed: no    Do you (patient/family) have any concerns for transition/discharge? Plan for utilizing home health:   no    Current Advanced Directive/Advance Care Plan: none             Transition of Care Plan:    Unable to interview pt, on droplet precautions, in restraints. Per chart pt from [facility], spoke with nurse, mrs davila. She states pt admitted there for iv antibxs  On dec 8th. she is unsure if pt will be long term or not. Pt only has scott. she states pt is own responsible party. nok they have listed is ex wife, Americo Houston. Ms Chantelle Parr called the number yesterday and number is disconnected. She fax'd their face sheet to me and pt has no there contacts listed or no other insurance. Plan return to [facility]. For snf vs long term. Patient has designated _______none_________________ to participate in his/her discharge plan and to receive any needed information. Name: none  Address:  Phone number:    Care Management Interventions  PCP Verified by CM: No  Palliative Care Criteria Met (RRAT>21 & CHF Dx)?: No  Mode of Transport at Discharge:  Other (see comment)  Transition of Care Consult (CM Consult): SNF  Partner SNF: Yes  Discharge Durable Medical Equipment: No  Physical Therapy Consult: No  Occupational Therapy Consult: No  Speech Therapy Consult: No  Current Support Network: Nursing Facility  Confirm Follow Up Transport: Other (see comment)  The Patient and/or Patient Representative was Provided with a Choice of Provider and Agrees with the Discharge Plan?: No  Freedom of Choice List was Provided with Basic Dialogue that Supports the Patient's Individualized Plan of Care/Goals, Treatment Preferences and Shares the Quality Data Associated with the Providers?: No  Discharge Location  Discharge Placement: Skilled nursing facility/ltc

## 2021-01-10 NOTE — PROGRESS NOTES
0730am Bedside shift report received from DAYANA Leonard  0800am- Titrate Levo IV gtt to keep MAP above 65. Lactic acid-3.  0900am Admin 500ml/hr IV bolus (Total 1L) as per Dr. Lester Davila. 0947am- Pt has not voided. Bladder scan done at the bedside. 413cc noted. 1000am Pt refuses to put kline catheter. Ok to wait until 1200pm if pt can void on his own, if not may reinsert kline catheter as per Dr. Mica Pritchard and Dr. Douglas Books.  1215pm- Insert chordae kline catheter #16 as per Dr Douglas Books.  700cc   1700pm-Duplex study of left upper extremity. Left subclavian axillary/basilic occlusive clot and brachial occlusive clot noted. 1715pm- Dr. Mica Pritchard notified of results. 1800pm Hep IV gtt started on pt. Vasopressin is running at 0.02u/hr, Levo IV gtt running at 3 mcg/hr. BP-95/65  1900pm Bedside shift change report given to Vielka Bryan RN by Sandrine Harris RN. Report included the following information SBAR, Kardex, MAR, Recent Results, Med Rec Status, Cardiac Rhythm Sinus Tachycardia and Alarm Parameters .

## 2021-01-10 NOTE — PROGRESS NOTES
Pharmacy Dosing Services: Vancomycin    Indication: Other: PICC line infection    Day of therapy: 1 / x    Other Antimicrobials (Include dose, start day & day of therapy):  n/a  Current Antimicrobial Therapy (168h ago, onward)       Ordered     Start Stop    01/10/21 1253  VANCOMYCIN INFORMATION NOTE  Other,   ONCE      21 1000 21 2159    01/10/21 1253  vancomycin (VANCOCIN) 1500 mg in  ml infusion  1,500 mg,   IntraVENous,   EVERY 36 HOURS      01/10/21 2300 --    01/10/21 1253  VANCOMYCIN INFORMATION NOTE  Other,   RX DOSING/MONITORING      01/10/21 1250 --    21 1057  cefepime (MAXIPIME) 2 g in 0.9% sodium chloride (MBP/ADV) 100 mL MBP  2 g,   IntraVENous,   EVERY 12 HOURS      21 1057 --                    Loading dose (date given): 2000 mg  Current Maintenance dose: 1500 mg IV every 36 hours    Goal Vancomycin Level: Vancomycin Trough: 15 - 20 mcg/mL (most infections)    Vancomycin Level (if drawn): No results for input(s): Alicia Barnhart in the last 72 hours. Pt Weight Weight: 90.2 kg (198 lb 12.8 oz)   Temperature Temp: 97.4 °F (36.3 °C)   Temp (72hrs), Av.6 °F (38.7 °C), Min:97.4 °F (36.3 °C), Max:103.2 °F (39.6 °C)     HR Pulse (Heart Rate): 98   BP BP: 111/74     Recent Labs     01/10/21  0411 21  1045   CREA 3.29* 2.49*   BUN 27* 15   WBC 28.7* 3.2*     Estimated Creatinine Clearance Estimated Creatinine Clearance: 27.2 mL/min (A) (based on SCr of 3.29 mg/dL (H)). Estimated Creatinine Clearance (using IBW): 23.7 mL/min      CAPD, Hemodialysis or Renal Replacement Therapy: N/A  Renal function stable? (unstable defined as SCr increase of 0.5 mg/dL or > 50% increase from baseline, whichever is greater) (Y/N): n     Significant Cultures:   Results       Procedure Component Value Units Date/Time    CULTURE, CATHETER TIP [[id number]] Collected: 21 1630    Order Status: Canceled Specimen: Catheter Tip from PICC     CULTURE, MISC.  AEROBIC [[id number]] Collected: 01/09/21 1630    Order Status: Completed Updated: 01/09/21 1718    INFLUENZA A & B AG (RAPID TEST) [[id number]] Collected: 01/09/21 1130    Order Status: Completed Specimen: Nasopharyngeal from Nasal washing Updated: 01/09/21 1201     Influenza A Antigen Negative        Comment: A negative result does not exclude influenza virus infection, seasonal or H1N1 due to suboptimal sensitivity. If influenza is circulating in your community, a diagnosis of influenza should be considered based on a patients clinical presentation and empiric antiviral treatment should be considered, if indicated. Influenza B Antigen Negative       CULTURE, BLOOD [[id number]] Collected: 01/09/21 1051    Order Status: Completed Specimen: Blood Updated: 01/10/21 1241     Special Requests: NO SPECIAL REQUESTS        Culture result: NO GROWTH 1 DAY       CULTURE, BLOOD [[id number]] Collected: 01/09/21 1045    Order Status: Completed Specimen: Blood Updated: 01/10/21 1241     Special Requests: NO SPECIAL REQUESTS        Culture result: NO GROWTH 1 DAY       CULTURE, BLOOD [[id number]] Collected: 01/01/21 1832    Order Status: Completed Specimen: Blood Updated: 01/07/21 0756     Special Requests: PERIPHERAL        Culture result: NO GROWTH 6 DAYS       CULTURE, BLOOD [[id number]] Collected: 01/01/21 1830    Order Status: Completed Specimen: Blood Updated: 01/07/21 0756     Special Requests: PERIPHERAL        Culture result: NO GROWTH 6 DAYS                 Regimen assessment: New start, very poor renal, watch closely  Maintenance dose: 1500 mg IV every 36 hours  Next scheduled level: 1/12 at 1030       Pharmacy will follow daily and adjust medications as appropriate for renal function and/or serum levels.     Thank you,  Navin Marcus  Pharmacist  [phone]

## 2021-01-10 NOTE — PROGRESS NOTES
[facility] Progress Note                                              I have reviewed the flowsheet and previous days notes. Events, vitals, medications and notes from last 24 hours reviewed. Care plan discussed with staff and on multidisciplinary rounds. Subjective:  1/10/2021   Patient reports he is doing better than yesterday. He complains of right lower extremity pain. No complaints of shortness of breath or chest pain    Impression and Plan  Patient Active Problem List   Diagnosis Code    PICC line infection T80.219A    Septic shock ([address] A41.9, R65.21    Type 2 diabetes mellitus ([address] E11.9    Schizoaffective disorder, bipolar type ([address] F25.0    Hyperlipidemia E78.5    Infected hardware in right leg ([address] T84. 7XXA    Lactic acidosis E87.2    Acute renal failure (ARF) (HCC) N17.9    Hypomagnesemia E83.42     Septic shock -patient has received IV fluid boluses as well as normal saline drip. Is currently on both Levophed and vasopressin though they are both coming down. Titrate pressors to keep MAP more than 65  Influenza screen is negative, rapid COVID-19 test is negative, Follow-up on culture results  Lactic acidosis  - patient's lactic acid level is coming down but is a still elevated. Continue with hydration and monitor  PICC line infection -patient's PICC line has been removed on 1/9/2021 and the tip has been sent for cultures. Patient's WBC count is very elevated and also has significant bandemia. Patient is on cefepime and vancomycin. ID has reviewed the chart and agree with antibiotics. Follow-up on the culture results and adjust antibiotics accordingly  Acute kidney injury -patient's creatinine has gone up further today. Secondary to shock, ATN, dehydration. Continue with hydration and monitor  Anemiapatient's hemoglobin has come down from yesterday, possibly secondary to hydration and IV fluids. No overt bleeding at this time.   Continue to monitor  Right lower extremity hardware infection status post removal -CT of the right lower extremity shows possible cellulitis, no specific findings of acute osteomyelitis. Diabetes mellitus type 2 -patient is on Lantus and insulin sliding scale  Lung nodule -patient's CT scan of chest on 12/2/2020 active her side shows Multiple subcentimeter noncalcified pulmonary nodules bilaterally, majority of which are stable in size.  The largest is in the right upper lobe peripherally, and measures 6 x 7 x 8 mm, previously measuring 7 mm maximally. Patient was recommended to follow-up with pulmonary as an outpatient as well as get a CT follow-up in 3 months. Patient is a scheduled for appointment with pulmonologist Dr. Surjit Gonzalez  Pain controlpatient requests oxycodone for pain control of his right lower extremity pain. Patient was on oxycodone as an outpatient. I will start him on low-dose Percocet  DVT and GI prophylaxispatient is on Lovenox and Protonix    OTHER:  Glycemic Control. Glucose stabilizer per ICU protocol when on insulin drip. Maintain blood glucose 140-180. Replace electrolytes per ICU electrolyte replacement protocol  HOB >=30 degree elevation all the time. Aggressive pulmonary toileting. Incentive spirometry when appropriate. Aspiration precautions. Sepsis bundle and protocol followed. Deescalate antibiotic when appropriate. Vasopressor when appropriate with MAP goal >65 mmHg. Central Line bundle followed, remove when not needed. Large bore IV line or CVP when appropriate. PT/OT eval and treat. OOB/IS when appropriate. Quality Care: Stress ulcer prophylaxis, DVT prophylaxis, HOB elevated, Infection control all reviewed and addressed. Events and notes from last 24 hours reviewed. Care plan discussed with nursing. CC TIME: >45 min     Medication Reviewed:     Allergies   Allergen Reactions    Aspirin Rash    Cheese Unknown (comments)    Codeine Rash      Past Medical History:   Diagnosis Date    Anxiety     Effusion, right knee     GERD (gastroesophageal reflux disease)     Hyperlipidemia     Hypertension     Infection and inflammatory reaction due to internal orthopedic device, implant, and graft ([facility]     Infection following a procedure, organ and space surgical site, subsequent encounter     Major depressive disorder     PTSD (post-traumatic stress disorder)     Schizoaffective disorder, bipolar type ([facility]     Type 2 diabetes mellitus ([facility]       History reviewed. No pertinent surgical history. Social History     Tobacco Use    Smoking status: Current Every Day Smoker     Packs/day: 0.25     Years: 40.00     Pack years: 10.00    Smokeless tobacco: Never Used   Substance Use Topics    Alcohol use: Yes     Comment: socially      History reviewed. No pertinent family history. Prior to Admission medications    Medication Sig Start Date End Date Taking? Authorizing Provider   acetaminophen (TylenoL) 325 mg tablet Take 650 mg by mouth every six (6) hours as needed for Pain. Yes Other, MD Pasha   insulin lispro (HUMALOG) 100 unit/mL injection by SubCUTAneous route. SLIDING SCALE   Yes Pasha Salas MD   cefTRIAXone (ROCEPHIN) 1 gram injection 2 g by IntraVENous route once. IV once a day for wound infection until 1/15/21   Yes Maritza, MD Pasha   docusate sodium (COLACE) 100 mg capsule Take 100 mg by mouth nightly. Yes Maritza, MD Pasha   insulin lispro (HUMALOG) 100 unit/mL kwikpen 6 Units by SubCUTAneous route Before breakfast, lunch, and dinner. Yes Maritza, MD Pasha   insulin glargine (Lantus Solostar U-100 Insulin) 100 unit/mL (3 mL) inpn 45 Units by SubCUTAneous route ACB/HS. Yes Maritza, MD Pasha   atorvastatin (Lipitor) 80 mg tablet Take 80 mg by mouth daily. Yes Maritza, MD Pasha   lisinopriL (PRINIVIL, ZESTRIL) 5 mg tablet Take  by mouth daily. Yes Pasha Salas MD   enoxaparin (Lovenox) 40 mg/0.4 mL 40 mg by SubCUTAneous route daily.    Yes Pasha Salas MD   gabapentin (Neurontin) 800 mg tablet Take 800 mg by mouth four (4) times daily. Yes Maritza, MD Pasha   oxyCODONE IR (ROXICODONE) 5 mg immediate release tablet Take 10 mg by mouth every six (6) hours as needed for Pain. Yes Maritza, MD Pasha   pantoprazole (Protonix) 40 mg granules for oral suspension Take 40 mg by mouth two (2) times a day. Yes Pasha Salas MD   FLUoxetine (PROzac) 40 mg capsule Take 80 mg by mouth daily. Yes Pasha Salas MD   QUEtiapine (SEROqueL) 400 mg tablet Take 400 mg by mouth every twelve (12) hours. Yes Maritza, MD Pasha     Current Facility-Administered Medications   Medication Dose Route Frequency    cefepime (MAXIPIME) 2 g in 0.9% sodium chloride (MBP/ADV) 100 mL MBP  2 g IntraVENous Q12H    NOREPINephrine (LEVOPHED) 8 mg in 0.9% NS 250ml infusion  0.5-16 mcg/min IntraVENous TITRATE    FLUoxetine (PROzac) capsule 80 mg  80 mg Oral DAILY    QUEtiapine (SEROquel) tablet 400 mg  400 mg Oral Q12H    vasopressin (VASOSTRICT) 20 Units in 0.9% sodium chloride 100 mL infusion  0.04 Units/min IntraVENous CONTINUOUS    sodium chloride (NS) flush 5-40 mL  5-40 mL IntraVENous Q8H    enoxaparin (LOVENOX) injection 40 mg  40 mg SubCUTAneous DAILY    pantoprazole (PROTONIX) 40 mg in 0.9% sodium chloride 10 mL injection  40 mg IntraVENous DAILY    0.9% sodium chloride infusion  150 mL/hr IntraVENous CONTINUOUS    insulin lispro (HUMALOG) injection   SubCUTAneous AC&HS    insulin glargine (LANTUS) injection 20 Units  20 Units SubCUTAneous QHS    QUEtiapine (SEROquel) 100 mg tablet           Lines: All central lines examined by me. No signs of erythema, induration, discharge.    Central Venous Catheter:  Triple Lumen 01/09/21 Left Other(comment) (Active)   Site Assessment Clean, dry, & intact 01/10/21 0400   Infiltration Assessment 0 01/10/21 0400     Peripheral Intravenous Line:  Peripheral IV 01/09/21 Right Antecubital (Active)   Site Assessment Clean, dry, & intact 01/10/21 0400   Phlebitis Assessment 0 01/10/21 0400 Infiltration Assessment 0 01/10/21 0400     Objective:  Vital Signs:    Visit Vitals  /74   Pulse (!) 103   Temp 97.7 °F (36.5 °C)   Resp 17   Ht 5' 7\" (1.702 m)   Wt 90.2 kg (198 lb 12.8 oz)   SpO2 94%   BMI 31.14 kg/m²      O2 Device: Room air  O2 Flow Rate (L/min): 2 l/min  Temp (24hrs), Av.8 °F (38.8 °C), Min:97.7 °F (36.5 °C), Max:103.2 °F (39.6 °C)      Intake/Output:   Last shift:      No intake/output data recorded. Last 3 shifts:  1901 - 01/10 0700  In: 2561.7 [I.V.:2561.7]  Out: 300 [Urine:300]      Intake/Output Summary (Last 24 hours) at 1/10/2021 0955  Last data filed at 1/10/2021 0700  Gross per 24 hour   Intake 2561.72 ml   Output 300 ml   Net 2261.72 ml       Last 3 Recorded Weights in this Encounter    21 1030 21 1104 21 2330   Weight: 81.6 kg (180 lb) 99.8 kg (220 lb) 90.2 kg (198 lb 12.8 oz)     Physical Exam:     General/Neurology: Alert, Awake,   Head:   Normocephalic, without obvious abnormality  Eye:   PERRL, EOM intact, no scleral icterus, no pallor  Oral:   Mucus membranes moist  Neck:   Supple  Lung:   B/l air entry is fair  Heart:   Regular rate & rhythm. S1 S2 present. Abdomen/: Soft, non tender, BS +nt  Extremities:  Right lower extremity scab is dry      Data:      Recent Results (from the past 24 hour(s))   CBC WITH AUTOMATED DIFF    Collection Time: 21 10:45 AM   Result Value Ref Range    WBC 3.2 (L) 4.6 - 13.2 K/uL    RBC 4.03 (L) 4.70 - 5.50 M/uL    HGB 11.1 (L) 13.0 - 16.0 g/dL    HCT 36.2 36.0 - 48.0 %    MCV 89.8 74.0 - 97.0 FL    MCH 27.5 24.0 - 34.0 PG    MCHC 30.7 (L) 31.0 - 37.0 g/dL    RDW 14.4 11.6 - 14.5 %    PLATELET 572 609 - 840 K/uL    MPV 9.0 (L) 9.2 - 11.8 FL    NEUTROPHILS 92 (H) 40 - 73 %    LYMPHOCYTES 7 (L) 21 - 52 %    MONOCYTES 0 (L) 3 - 10 %    EOSINOPHILS 1 0 - 5 %    BASOPHILS 0 0 - 2 %    ABS. NEUTROPHILS 2.9 1.8 - 8.0 K/UL    ABS. LYMPHOCYTES 0.2 (L) 0.9 - 3.6 K/UL    ABS.  MONOCYTES 0.0 (L) 0.05 - 1.2 K/UL ABS. EOSINOPHILS 0.0 0.0 - 0.4 K/UL    ABS.  BASOPHILS 0.0 0.0 - 0.1 K/UL    DF AUTOMATED     METABOLIC PANEL, BASIC    Collection Time: 01/09/21 10:45 AM   Result Value Ref Range    Sodium 135 (L) 136 - 145 mmol/L    Potassium 3.8 3.5 - 5.5 mmol/L    Chloride 100 100 - 111 mmol/L    CO2 24 21 - 32 mmol/L    Anion gap 11 3.0 - 18 mmol/L    Glucose 161 (H) 74 - 99 mg/dL    BUN 15 7.0 - 18 MG/DL    Creatinine 2.49 (H) 0.6 - 1.3 MG/DL    BUN/Creatinine ratio 6 (L) 12 - 20      GFR est AA 33 (L) >60 ml/min/1.73m2    GFR est non-AA 27 (L) >60 ml/min/1.73m2    Calcium 8.6 8.5 - 10.1 MG/DL   CARDIAC PANEL,(CK, CKMB & TROPONIN)    Collection Time: 01/09/21 10:45 AM   Result Value Ref Range    CK - MB <1.0 <3.6 ng/ml    CK-MB Index  0.0 - 4.0 %     CALCULATION NOT PERFORMED WHEN RESULT IS BELOW LINEAR LIMIT    CK 35 (L) 39 - 308 U/L    Troponin-I, QT <0.02 0.0 - 0.045 NG/ML   MAGNESIUM    Collection Time: 01/09/21 10:45 AM   Result Value Ref Range    Magnesium 1.7 1.6 - 2.6 mg/dL   PHOSPHORUS    Collection Time: 01/09/21 10:45 AM   Result Value Ref Range    Phosphorus 1.0 (L) 2.5 - 4.9 MG/DL   PROTHROMBIN TIME + INR    Collection Time: 01/09/21 10:45 AM   Result Value Ref Range    Prothrombin time 18.3 (H) 11.5 - 15.2 sec    INR 1.6 (H) 0.8 - 1.2     PTT    Collection Time: 01/09/21 10:45 AM   Result Value Ref Range    aPTT 46.4 (H) 23.0 - 36.4 SEC   HEMOGLOBIN A1C WITH EAG    Collection Time: 01/09/21 10:45 AM   Result Value Ref Range    Hemoglobin A1c 7.9 (H) 4.2 - 5.6 %    Est. average glucose 180 mg/dL   POC LACTIC ACID    Collection Time: 01/09/21 10:46 AM   Result Value Ref Range    Lactic Acid (POC) 6.21 (HH) 0.40 - 2.00 mmol/L   EKG, 12 LEAD, INITIAL    Collection Time: 01/09/21 10:57 AM   Result Value Ref Range    Ventricular Rate 132 BPM    Atrial Rate 132 BPM    P-R Interval 122 ms    QRS Duration 80 ms    Q-T Interval 304 ms    QTC Calculation (Bezet) 450 ms    Calculated P Axis 55 degrees    Calculated R Axis 16 degrees    Calculated T Axis 50 degrees    Diagnosis       Sinus tachycardia  Otherwise normal ECG  No previous ECGs available  Confirmed by Tomás Dodson MD, [facility] ([id number]) on 1/9/2021 4:39:08 PM     SARS-COV-2    Collection Time: 01/09/21 11:30 AM   Result Value Ref Range    Specimen source NP Swab      COVID-19 rapid test Not detected NOTD      Specimen type NP Swab      Health status NP Swab     INFLUENZA A & B AG (RAPID TEST)    Collection Time: 01/09/21 11:30 AM   Result Value Ref Range    Influenza A Antigen Negative NEG      Influenza B Antigen Negative NEG     URINALYSIS W/ RFLX MICROSCOPIC    Collection Time: 01/09/21  1:56 PM   Result Value Ref Range    Color YELLOW      Appearance CLEAR      Specific gravity 1.013 1.005 - 1.030      pH (UA) 5.5 5.0 - 8.0      Protein Negative NEG mg/dL    Glucose Negative NEG mg/dL    Ketone Negative NEG mg/dL    Bilirubin Negative NEG      Blood Negative NEG      Urobilinogen 0.2 0.2 - 1.0 EU/dL    Nitrites Negative NEG      Leukocyte Esterase Negative NEG     POC LACTIC ACID    Collection Time: 01/09/21  3:44 PM   Result Value Ref Range    Lactic Acid (POC) 2.90 (HH) 0.40 - 2.00 mmol/L   POC G3    Collection Time: 01/09/21  5:13 PM   Result Value Ref Range    Device: NASAL CANNULA      Flow rate (POC) 3.5 L/M    FIO2 (POC) 34 %    pH (POC) 7.39 7.35 - 7.45      pCO2 (POC) 30.6 (L) 35.0 - 45.0 MMHG    pO2 (POC) 67 (L) 80 - 100 MMHG    HCO3 (POC) 18.2 (L) 22 - 26 MMOL/L    sO2 (POC) 91 (L) 92 - 97 %    Base deficit (POC) 6 mmol/L    Allens test (POC) NO      Total resp. rate 24      Site LEFT RADIAL      Patient temp.  102.2      Specimen type (POC) ARTERIAL      Performed by Joseph Nunn    POC LACTIC ACID    Collection Time: 01/09/21  9:04 PM   Result Value Ref Range    Lactic Acid (POC) 2.37 (HH) 0.40 - 2.00 mmol/L   GLUCOSE, POC    Collection Time: 01/09/21 10:09 PM   Result Value Ref Range    Glucose (POC) 181 (H) 70 - 863 mg/dL   METABOLIC PANEL, BASIC    Collection Time: 01/10/21  4:11 AM   Result Value Ref Range    Sodium 140 136 - 145 mmol/L    Potassium 5.1 3.5 - 5.5 mmol/L    Chloride 109 100 - 111 mmol/L    CO2 20 (L) 21 - 32 mmol/L    Anion gap 11 3.0 - 18 mmol/L    Glucose 235 (H) 74 - 99 mg/dL    BUN 27 (H) 7.0 - 18 MG/DL    Creatinine 3.29 (H) 0.6 - 1.3 MG/DL    BUN/Creatinine ratio 8 (L) 12 - 20      GFR est AA 24 (L) >60 ml/min/1.73m2    GFR est non-AA 20 (L) >60 ml/min/1.73m2    Calcium 7.0 (L) 8.5 - 10.1 MG/DL   CBC WITH AUTOMATED DIFF    Collection Time: 01/10/21  4:11 AM   Result Value Ref Range    WBC 28.7 (H) 4.6 - 13.2 K/uL    RBC 3.16 (L) 4.70 - 5.50 M/uL    HGB 8.7 (L) 13.0 - 16.0 g/dL    HCT 28.9 (L) 36.0 - 48.0 %    MCV 91.5 74.0 - 97.0 FL    MCH 27.5 24.0 - 34.0 PG    MCHC 30.1 (L) 31.0 - 37.0 g/dL    RDW 14.8 (H) 11.6 - 14.5 %    PLATELET 415 744 - 290 K/uL    MPV 9.7 9.2 - 11.8 FL    NEUTROPHILS PENDING %    LYMPHOCYTES PENDING %    MONOCYTES PENDING %    EOSINOPHILS PENDING %    BASOPHILS PENDING %    ABS. NEUTROPHILS PENDING K/UL    ABS. LYMPHOCYTES PENDING K/UL    ABS. MONOCYTES PENDING K/UL    ABS. EOSINOPHILS PENDING K/UL    ABS.  BASOPHILS PENDING K/UL    DF PENDING    LACTIC ACID    Collection Time: 01/10/21  4:11 AM   Result Value Ref Range    Lactic acid 3.1 (HH) 0.4 - 2.0 MMOL/L   GLUCOSE, POC    Collection Time: 01/10/21  8:29 AM   Result Value Ref Range    Glucose (POC) 228 (H) 70 - 110 mg/dL   LACTIC ACID    Collection Time: 01/10/21  8:34 AM   Result Value Ref Range    Lactic acid 2.9 (HH) 0.4 - 2.0 MMOL/L         Chemistry   Recent Labs     01/10/21  0411 01/09/21  1045   * 161*    135*   K 5.1 3.8    100   CO2 20* 24   BUN 27* 15   CREA 3.29* 2.49*   CA 7.0* 8.6   MG  --  1.7   PHOS  --  1.0*   AGAP 11 11   BUCR 8* 6*       CBC w/Diff   Recent Labs     01/10/21  0411 01/09/21  1045   WBC 28.7* 3.2*   RBC 3.16* 4.03*   HGB 8.7* 11.1*   HCT 28.9* 36.2    231   GRANS PENDING 92*   LYMPH PENDING 7*   EOS PENDING 1 ABG    Recent Labs     01/09/21  1713   PHI 7.39   PCO2I 30.6*   PO2I 67*   HCO3I 18.2*   FIO2I 34     XR (Most Recent). CXR reviewed by me and compared with previous CXR   Results from Hospital Encounter encounter on 01/09/21   XR CHEST PORT    Narrative EXAM: XR CHEST PORT    CLINICAL INDICATION/HISTORY: SOB    > Additional: None. COMPARISON: None. TECHNIQUE: Portable chest    _______________    FINDINGS:    SUPPORT DEVICES: None. HEART AND MEDIASTINUM: Normal size and contour. Normal pulmonary vasculature. LUNGS AND PLEURAL SPACES: The lungs are well expanded and clear. No focal  consolidation, effusion, or pneumothorax. BONY THORAX AND SOFT TISSUES: No acute osseous abnormality. _______________      Impression IMPRESSION:    No active cardiopulmonary disease. High complexity decision making was performed during the evaluation of this patient at high risk for decompensation with multiple organ involvement     Above mentioned total time spent on reviewing the case/medical record/data/notes/EMR/patient examination/documentation/coordinating care with nurse/consultants, exclusive of procedures with complex decision making performed and > 50% time spent in face to face evaluation.     This note has been dictated using the dragon dictation system    Jenny Puente MD  1/10/2021

## 2021-01-10 NOTE — ED NOTES
TRANSFER - ED to INPATIENT REPORT:    Verbal report given to RN(name) on Elie De La Cruz  being transferred to 2602(unit) for routine progression of care       Report consisted of patients Situation, Background, Assessment and   Recommendations(SBAR). SBAR report made available to receiving floor on this patient being transferred to [facility] ICU [id number])  for routine progression of care       Admitting diagnosis Septic shock ([address] [A41.9, R65.21]  PICC line infection [T80.219A]    Information from the following report(s) SBAR was made available to receiving floor. Lines:   Triple Lumen 01/09/21 Left Other(comment) (Active)       Peripheral IV 01/09/21 Right Antecubital (Active)       Peripheral IV 01/09/21 Right Antecubital (Active)   Site Assessment Clean, dry, & intact 01/09/21 1057   Phlebitis Assessment 0 01/09/21 1057   Infiltration Assessment 0 01/09/21 1057   Dressing Status Clean, dry, & intact 01/09/21 1057   Hub Color/Line Status Pink 01/09/21 1057       Peripheral IV 01/09/21 Right Forearm (Active)   Site Assessment Clean, dry, & intact 01/09/21 1058   Phlebitis Assessment 0 01/09/21 1058   Infiltration Assessment 0 01/09/21 1058   Dressing Status Clean, dry, & intact 01/09/21 1058   Hub Color/Line Status Pink 01/09/21 1058            Medication list updated today    Opportunity for questions and clarification was provided.       Patient is oriented to time, place, person and situation High alert med  Patient is  continent and ambulatory with assist     Valuables transported with patient     Patient transported with:   Monitor  Registered Nurse    MAP (Monitor): 73 =Monitored (most recent)  Vitals w/ MEWS Score (last day)     Date/Time MEWS Score Pulse Resp Temp BP Level of Consciousness SpO2    01/09/21 2040    (!) 113  21  (!) 100.7 °F (38.2 °C)  90/69    94 %    01/09/21 2035    (!) 113  18  (!) 100.7 °F (38.2 °C)  97/66    92 %    01/09/21 2030    (!) 114  19  (!) 100.7 °F (38.2 °C)  106/76    94 %    01/09/21 2025    (!) 112  20  (!) 100.7 °F (38.2 °C)  102/66    94 %    01/09/21 2010    (!) 113  22  (!) 100.7 °F (38.2 °C)  110/60    94 %    01/09/21 1950    (!) 112  22  (!) 100.9 °F (38.3 °C)  93/68    94 %    01/09/21 1930    (!) 116  24  (!) 101 °F (38.3 °C)  97/60    93 %    01/09/21 1845    (!) 116  25  (!) 101.4 °F (38.6 °C)  (!) 92/56    94 %    01/09/21 1840    (!) 117  22  (!) 101.4 °F (38.6 °C)  (!) 95/56    94 %    01/09/21 1835    (!) 118  21  (!) 101.5 °F (38.6 °C)  (!) 94/57    91 %    01/09/21 1830    (!) 118  21  (!) 101.7 °F (38.7 °C)  (!) 90/57    94 %    01/09/21 1825    (!) 120  19  (!) 101.8 °F (38.8 °C)  (!) 90/57    95 %    01/09/21 1800    (!) 118  27  (!) 102.4 °F (39.1 °C)  115/61    97 %    01/09/21 1745    (!) 119  21  (!) 102.4 °F (39.1 °C)  103/66    97 %    01/09/21 1740    (!) 118  20  (!) 102.5 °F (39.2 °C)  108/60    98 %    01/09/21 1735    (!) 119  23  (!) 102.5 °F (39.2 °C)  109/60    97 %    01/09/21 1730    (!) 120  23  (!) 102.4 °F (39.1 °C)  103/63    97 %    01/09/21 1725    (!) 120  24  (!) 102.4 °F (39.1 °C)  99/60    97 %    01/09/21 1720    (!) 120  24  (!) 102.4 °F (39.1 °C)  97/60    96 %    01/09/21 1715    (!) 120  18  (!) 102.4 °F (39.1 °C)  (!) 106/59    97 %    01/09/21 1710    (!) 120  22  (!) 102.3 °F (39.1 °C)  (!) 105/58    94 %    01/09/21 1705    (!) 121  23  (!) 102.3 °F (39.1 °C)  (!) 98/58    94 %    01/09/21 1700    (!) 121  20  (!) 102.3 °F (39.1 °C)  (!) 102/58    94 %    01/09/21 1655    (!) 123  17  (!) 102.2 °F (39 °C)  (!) 95/55    95 %    01/09/21 1650    (!) 122  20  (!) 102.2 °F (39 °C)  (!) 91/53    96 %    01/09/21 1645    (!) 123  24  (!) 102.3 °F (39.1 °C)      95 %    01/09/21 1640    (!) 122  23  (!) 102.3 °F (39.1 °C)  (!) 102/51    93 %    01/09/21 1635    (!) 122  24  (!) 102.3 °F (39.1 °C)      93 %    01/09/21 1630    (!) 122  22  (!) 102.4 °F (39.1 °C)  (!) 92/54    93 % 01/09/21 1625    (!) 122  23  (!) 102.4 °F (39.1 °C)      94 %    01/09/21 1620    (!) 123  21  (!) 102.4 °F (39.1 °C)  (!) 80/51    94 %    01/09/21 1619    (!) 123  24  (!) 102.4 °F (39.1 °C)  (!) 83/50    95 %    01/09/21 1615    (!) 123  22  (!) 102.4 °F (39.1 °C)  (!) 66/38    93 %    01/09/21 1600    (!) 121  25  (!) 102.4 °F (39.1 °C)  (!) 78/51    97 %    01/09/21 1545    (!) 121  22  (!) 102.5 °F (39.2 °C)  (!) 88/50    96 %    01/09/21 1530    (!) 121  26  (!) 102.5 °F (39.2 °C)  (!) 86/53    96 %    01/09/21 1517    (!) 120  24  (!) 102.6 °F (39.2 °C)  (!) 88/47    97 %    01/09/21 1515    (!) 120  25  (!) 102.6 °F (39.2 °C)      95 %    01/09/21 1510    (!) 121  24  (!) 102.6 °F (39.2 °C)      95 %    01/09/21 1505    (!) 121  25  (!) 102.6 °F (39.2 °C)      97 %    01/09/21 1500        (!) 102.6 °F (39.2 °C)      96 %    01/09/21 1458    (!) 121  28  (!) 102.6 °F (39.2 °C)      96 %    01/09/21 1457          (!) 110/90        01/09/21 1425    (!) 119  28  (!) 102.4 °F (39.1 °C)      96 %    01/09/21 1415    (!) 118  26  (!) 102.2 °F (39 °C)  (!) 93/49    98 %    01/09/21 1408    (!) 119  27    (!) 84/50    98 %    01/09/21 1400    (!) 119  25    (!) 91/53    98 %    01/09/21 1345    (!) 108  28    (!) 125/53    99 %    01/09/21 1330    (!) 118  27    (!) 86/58    98 %    01/09/21 1315    (!) 118  26    (!) 83/52    98 %    01/09/21 1245    (!) 117  26    (!) 84/59    96 %    01/09/21 1230    (!) 118  25    (!) 74/53    94 %    01/09/21 1222    (!) 119  28    (!) 74/50    90 %    01/09/21 1200    (!) 121  24    (!) 79/55    97 %    01/09/21 1139    (!) 121  23        99 %    01/09/21 1138    (!) 121  23        99 %    01/09/21 1137    (!) 121  22        100 %    01/09/21 1136    (!) 120  23        100 %    01/09/21 1135    (!) 120  23        100 %    01/09/21 1134    (!) 121  22        100 %    01/09/21 1133    (!) 121  22        100 %    01/09/21 1132    (!) 121  23        100 %    01/09/21 1131    (!) 122  23        98 %    01/09/21 1130    (!) 122  22    (!) 80/57    94 %    01/09/21 1129    (!) 122  21        94 %    01/09/21 1128    (!) 122  22        94 %    01/09/21 1127    (!) 122  22        96 %    01/09/21 1126    (!) 122  23        99 %    01/09/21 1125    (!) 123  25        99 %    01/09/21 1124    (!) 122  26        100 %    01/09/21 1123    (!) 123  23        100 %    01/09/21 1122    (!) 123  19        100 %    01/09/21 1121    (!) 123  21        100 %    01/09/21 1120    (!) 124  25        100 %    01/09/21 1119    (!) 125  27        100 %    01/09/21 1118    (!) 124  27        100 %    01/09/21 1117    (!) 122  26        100 %    01/09/21 1116    (!) 124  23        100 %    01/09/21 1115    (!) 123  22    (!) 85/55    100 %    01/09/21 1114    (!) 123  24        (!) 67 %    01/09/21 1113    (!) 123  25        (!) 47 %    01/09/21 1112    (!) 123  27    (!) 83/56    (!) 71 %    01/09/21 1111    (!) 124  28        97 %    01/09/21 1110    (!) 124  26        98 %    01/09/21 1109    (!) 125  26        100 %    01/09/21 1108    (!) 126  24        100 %    01/09/21 1107    (!) 128  23        99 %    01/09/21 1106    (!) 131  30        100 %    01/09/21 1105    (!) 131  19        96 %    01/09/21 1104    (!) 131  25        97 %    01/09/21 1103    (!) 130  26        95 %    01/09/21 1102    (!) 131  (!) 32        93 %    01/09/21 1101    (!) 131  (!) 32        93 %    01/09/21 1100    (!) 132  29    (!) 75/52    95 %    01/09/21 1059    (!) 132  29    (!) 76/61    95 %    01/09/21 1058    (!) 132  27    (!) 78/52    95 %    01/09/21 1057    (!) 133  (!) 31        96 %    01/09/21 1056    (!) 133  (!) 31        96 %    01/09/21 1055    (!) 133  29        96 %    01/09/21 1054    (!) 135  (!) 34        97 %    01/09/21 1053    (!) 136  25        96 %    01/09/21 1052    (!) 136  20        97 %    01/09/21 1051    (!) 136  23        93 %    01/09/21 1050    (!) 137  22        92 %    01/09/21 1049    (!) 138  25        92 %    01/09/21 1048    (!) 138  28        92 %    01/09/21 1047    (!) 138  24        93 %    01/09/21 1046    (!) 139  26        93 %    01/09/21 1045    (!) 140  25        93 %    01/09/21 1044    (!) 140  30        92 %    01/09/21 1043    (!) 139  (!) 31        92 %    01/09/21 1042    (!) 141  29        93 %    01/09/21 1041    (!) 141  27        92 %    01/09/21 1040    (!) 140  27        90 %    01/09/21 1039    (!) 142  (!) 32        90 %    01/09/21 1038    (!) 142  (!) 34        91 %    01/09/21 1037    (!) 143  (!) 32        91 %    01/09/21 1036    (!) 143  27        91 %    01/09/21 1035    (!) 143  25        91 %    01/09/21 1034    (!) 143  24        91 %    01/09/21 1033              90 %    01/09/21 1032          101/78        01/09/21 1030  7  (!) 154  28  (!) 103.2 °F (39.6 °C)  (!) 148/78  Alert  (!) 89 %              Septic Patients:     Lactic Acid  Lab Results   Component Value Date    LACPOC 2.90 () 01/09/2021    LACPOC 6.21 () 01/09/2021    (Most recent on top)  Repeat drawn: YES Time: 2100     ALL LACTIC ACIDS GREATER THAN 2 MUST BE REPEATED POC WITHIN 4 HOURS OR PRIOR TO ADMISSION               Total Fluid Bolus initiated and documented on MAR: YES    All ordered antibiotics initiated within first 3 hours of TIME ZERO?   YES

## 2021-01-11 ENCOUNTER — APPOINTMENT (OUTPATIENT)
Dept: NON INVASIVE DIAGNOSTICS | Age: 56
DRG: 721 | End: 2021-01-11
Attending: INTERNAL MEDICINE
Payer: MEDICAID

## 2021-01-11 LAB
ALBUMIN SERPL-MCNC: 1.7 G/DL (ref 3.4–5)
ALBUMIN/GLOB SERPL: 0.4 {RATIO} (ref 0.8–1.7)
ALP SERPL-CCNC: 288 U/L (ref 45–117)
ALT SERPL-CCNC: 66 U/L (ref 16–61)
ANION GAP SERPL CALC-SCNC: 10 MMOL/L (ref 3–18)
APTT PPP: 58.2 SEC (ref 23–36.4)
APTT PPP: 60.5 SEC (ref 23–36.4)
APTT PPP: >180 SEC (ref 23–36.4)
AST SERPL-CCNC: 58 U/L (ref 10–38)
BASOPHILS # BLD: 0 K/UL (ref 0–0.1)
BASOPHILS # BLD: 0 K/UL (ref 0–0.1)
BASOPHILS NFR BLD: 0 % (ref 0–2)
BASOPHILS NFR BLD: 0 % (ref 0–2)
BILIRUB DIRECT SERPL-MCNC: <0.1 MG/DL (ref 0–0.2)
BILIRUB SERPL-MCNC: 0.3 MG/DL (ref 0.2–1)
BUN SERPL-MCNC: 32 MG/DL (ref 7–18)
BUN/CREAT SERPL: 16 (ref 12–20)
CALCIUM SERPL-MCNC: 6.5 MG/DL (ref 8.5–10.1)
CHLORIDE SERPL-SCNC: 114 MMOL/L (ref 100–111)
CO2 SERPL-SCNC: 19 MMOL/L (ref 21–32)
CREAT SERPL-MCNC: 2.03 MG/DL (ref 0.6–1.3)
DIFFERENTIAL METHOD BLD: ABNORMAL
DIFFERENTIAL METHOD BLD: ABNORMAL
EOSINOPHIL # BLD: 0.1 K/UL (ref 0–0.4)
EOSINOPHIL # BLD: 0.2 K/UL (ref 0–0.4)
EOSINOPHIL NFR BLD: 1 % (ref 0–5)
EOSINOPHIL NFR BLD: 1 % (ref 0–5)
ERYTHROCYTE [DISTWIDTH] IN BLOOD BY AUTOMATED COUNT: 15.2 % (ref 11.6–14.5)
ERYTHROCYTE [DISTWIDTH] IN BLOOD BY AUTOMATED COUNT: 15.3 % (ref 11.6–14.5)
GLOBULIN SER CALC-MCNC: 4.1 G/DL (ref 2–4)
GLUCOSE BLD STRIP.AUTO-MCNC: 107 MG/DL (ref 70–110)
GLUCOSE BLD STRIP.AUTO-MCNC: 124 MG/DL (ref 70–110)
GLUCOSE BLD STRIP.AUTO-MCNC: 148 MG/DL (ref 70–110)
GLUCOSE BLD STRIP.AUTO-MCNC: 175 MG/DL (ref 70–110)
GLUCOSE SERPL-MCNC: 151 MG/DL (ref 74–99)
HCT VFR BLD AUTO: 23.4 % (ref 36–48)
HCT VFR BLD AUTO: 23.9 % (ref 36–48)
HCT VFR BLD AUTO: 25.4 % (ref 36–48)
HGB BLD-MCNC: 7.2 G/DL (ref 13–16)
HGB BLD-MCNC: 7.4 G/DL (ref 13–16)
HGB BLD-MCNC: 7.7 G/DL (ref 13–16)
INR PPP: 1.6 (ref 0.8–1.2)
LYMPHOCYTES # BLD: 2.5 K/UL (ref 0.9–3.6)
LYMPHOCYTES # BLD: 2.6 K/UL (ref 0.9–3.6)
LYMPHOCYTES NFR BLD: 15 % (ref 21–52)
LYMPHOCYTES NFR BLD: 17 % (ref 21–52)
MCH RBC QN AUTO: 27.7 PG (ref 24–34)
MCH RBC QN AUTO: 27.8 PG (ref 24–34)
MCHC RBC AUTO-ENTMCNC: 30.8 G/DL (ref 31–37)
MCHC RBC AUTO-ENTMCNC: 31 G/DL (ref 31–37)
MCV RBC AUTO: 89.8 FL (ref 74–97)
MCV RBC AUTO: 90 FL (ref 74–97)
MONOCYTES # BLD: 0.5 K/UL (ref 0.05–1.2)
MONOCYTES # BLD: 0.6 K/UL (ref 0.05–1.2)
MONOCYTES NFR BLD: 3 % (ref 3–10)
MONOCYTES NFR BLD: 4 % (ref 3–10)
NEUTS SEG # BLD: 12.4 K/UL (ref 1.8–8)
NEUTS SEG # BLD: 13.3 K/UL (ref 1.8–8)
NEUTS SEG NFR BLD: 79 % (ref 40–73)
NEUTS SEG NFR BLD: 80 % (ref 40–73)
PLATELET # BLD AUTO: 211 K/UL (ref 135–420)
PLATELET # BLD AUTO: 221 K/UL (ref 135–420)
PMV BLD AUTO: 10 FL (ref 9.2–11.8)
PMV BLD AUTO: 9.9 FL (ref 9.2–11.8)
POTASSIUM SERPL-SCNC: 3.7 MMOL/L (ref 3.5–5.5)
PROT SERPL-MCNC: 5.8 G/DL (ref 6.4–8.2)
PROTHROMBIN TIME: 18.7 SEC (ref 11.5–15.2)
RBC # BLD AUTO: 2.6 M/UL (ref 4.7–5.5)
RBC # BLD AUTO: 2.66 M/UL (ref 4.7–5.5)
SODIUM SERPL-SCNC: 143 MMOL/L (ref 136–145)
WBC # BLD AUTO: 15.7 K/UL (ref 4.6–13.2)
WBC # BLD AUTO: 16.6 K/UL (ref 4.6–13.2)

## 2021-01-11 PROCEDURE — 93306 TTE W/DOPPLER COMPLETE: CPT

## 2021-01-11 PROCEDURE — 85730 THROMBOPLASTIN TIME PARTIAL: CPT

## 2021-01-11 PROCEDURE — 74011250636 HC RX REV CODE- 250/636: Performed by: INTERNAL MEDICINE

## 2021-01-11 PROCEDURE — 74011636637 HC RX REV CODE- 636/637: Performed by: INTERNAL MEDICINE

## 2021-01-11 PROCEDURE — 3E033XZ INTRODUCTION OF VASOPRESSOR INTO PERIPHERAL VEIN, PERCUTANEOUS APPROACH: ICD-10-PCS | Performed by: INTERNAL MEDICINE

## 2021-01-11 PROCEDURE — 74011636637 HC RX REV CODE- 636/637: Performed by: HOSPITALIST

## 2021-01-11 PROCEDURE — 74011250636 HC RX REV CODE- 250/636: Performed by: HOSPITALIST

## 2021-01-11 PROCEDURE — 2709999900 HC NON-CHARGEABLE SUPPLY

## 2021-01-11 PROCEDURE — C9113 INJ PANTOPRAZOLE SODIUM, VIA: HCPCS | Performed by: HOSPITALIST

## 2021-01-11 PROCEDURE — 74011250637 HC RX REV CODE- 250/637: Performed by: INTERNAL MEDICINE

## 2021-01-11 PROCEDURE — 99291 CRITICAL CARE FIRST HOUR: CPT | Performed by: INTERNAL MEDICINE

## 2021-01-11 PROCEDURE — 85018 HEMOGLOBIN: CPT

## 2021-01-11 PROCEDURE — 74011000250 HC RX REV CODE- 250: Performed by: HOSPITALIST

## 2021-01-11 PROCEDURE — 85025 COMPLETE CBC W/AUTO DIFF WBC: CPT

## 2021-01-11 PROCEDURE — 80076 HEPATIC FUNCTION PANEL: CPT

## 2021-01-11 PROCEDURE — 65610000006 HC RM INTENSIVE CARE

## 2021-01-11 PROCEDURE — 80048 BASIC METABOLIC PNL TOTAL CA: CPT

## 2021-01-11 PROCEDURE — 74011000258 HC RX REV CODE- 258: Performed by: HOSPITALIST

## 2021-01-11 PROCEDURE — 85610 PROTHROMBIN TIME: CPT

## 2021-01-11 PROCEDURE — 74011000258 HC RX REV CODE- 258: Performed by: INTERNAL MEDICINE

## 2021-01-11 PROCEDURE — 82962 GLUCOSE BLOOD TEST: CPT

## 2021-01-11 PROCEDURE — 74011250637 HC RX REV CODE- 250/637: Performed by: HOSPITALIST

## 2021-01-11 RX ORDER — INSULIN GLARGINE 100 [IU]/ML
22 INJECTION, SOLUTION SUBCUTANEOUS
Status: DISCONTINUED | OUTPATIENT
Start: 2021-01-11 | End: 2021-01-14

## 2021-01-11 RX ORDER — SODIUM CHLORIDE, SODIUM LACTATE, POTASSIUM CHLORIDE, CALCIUM CHLORIDE 600; 310; 30; 20 MG/100ML; MG/100ML; MG/100ML; MG/100ML
25 INJECTION, SOLUTION INTRAVENOUS CONTINUOUS
Status: DISCONTINUED | OUTPATIENT
Start: 2021-01-11 | End: 2021-01-13

## 2021-01-11 RX ORDER — POTASSIUM CHLORIDE 750 MG/1
10 TABLET, EXTENDED RELEASE ORAL ONCE
Status: DISCONTINUED | OUTPATIENT
Start: 2021-01-11 | End: 2021-01-11

## 2021-01-11 RX ORDER — POTASSIUM CHLORIDE 750 MG/1
10 TABLET, EXTENDED RELEASE ORAL ONCE
Status: COMPLETED | OUTPATIENT
Start: 2021-01-11 | End: 2021-01-11

## 2021-01-11 RX ORDER — VANCOMYCIN/0.9 % SOD CHLORIDE 1.5G/250ML
1500 PLASTIC BAG, INJECTION (ML) INTRAVENOUS EVERY 24 HOURS
Status: DISCONTINUED | OUTPATIENT
Start: 2021-01-12 | End: 2021-01-13 | Stop reason: ALTCHOICE

## 2021-01-11 RX ORDER — HEPARIN SODIUM 1000 [USP'U]/ML
80 INJECTION, SOLUTION INTRAVENOUS; SUBCUTANEOUS ONCE
Status: DISCONTINUED | OUTPATIENT
Start: 2021-01-11 | End: 2021-01-11

## 2021-01-11 RX ORDER — HEPARIN SODIUM 1000 [USP'U]/ML
3790 INJECTION, SOLUTION INTRAVENOUS; SUBCUTANEOUS ONCE
Status: COMPLETED | OUTPATIENT
Start: 2021-01-11 | End: 2021-01-11

## 2021-01-11 RX ORDER — LANOLIN ALCOHOL/MO/W.PET/CERES
6 CREAM (GRAM) TOPICAL
Status: DISCONTINUED | OUTPATIENT
Start: 2021-01-11 | End: 2021-01-20 | Stop reason: HOSPADM

## 2021-01-11 RX ORDER — HEPARIN SODIUM 1000 [USP'U]/ML
40 INJECTION, SOLUTION INTRAVENOUS; SUBCUTANEOUS ONCE
Status: COMPLETED | OUTPATIENT
Start: 2021-01-11 | End: 2021-01-11

## 2021-01-11 RX ORDER — HEPARIN SODIUM 1000 [USP'U]/ML
INJECTION, SOLUTION INTRAVENOUS; SUBCUTANEOUS
Status: DISCONTINUED
Start: 2021-01-11 | End: 2021-01-11 | Stop reason: WASHOUT

## 2021-01-11 RX ADMIN — SODIUM CHLORIDE 10 ML: 9 INJECTION, SOLUTION INTRAMUSCULAR; INTRAVENOUS; SUBCUTANEOUS at 08:35

## 2021-01-11 RX ADMIN — QUETIAPINE FUMARATE 400 MG: 200 TABLET ORAL at 21:25

## 2021-01-11 RX ADMIN — SODIUM CHLORIDE, SODIUM LACTATE, POTASSIUM CHLORIDE, AND CALCIUM CHLORIDE 75 ML/HR: 600; 310; 30; 20 INJECTION, SOLUTION INTRAVENOUS at 22:51

## 2021-01-11 RX ADMIN — OXYCODONE AND ACETAMINOPHEN 1 TABLET: 5; 325 TABLET ORAL at 18:08

## 2021-01-11 RX ADMIN — OXYCODONE AND ACETAMINOPHEN 1 TABLET: 5; 325 TABLET ORAL at 12:06

## 2021-01-11 RX ADMIN — HEPARIN SODIUM 3790 UNITS: 1000 INJECTION INTRAVENOUS; SUBCUTANEOUS at 19:57

## 2021-01-11 RX ADMIN — VASOPRESSIN 0.02 UNITS/MIN: 20 INJECTION INTRAVENOUS at 09:01

## 2021-01-11 RX ADMIN — CEFEPIME 2 G: 2 INJECTION, POWDER, FOR SOLUTION INTRAVENOUS at 22:51

## 2021-01-11 RX ADMIN — SODIUM CHLORIDE 10 ML: 9 INJECTION, SOLUTION INTRAMUSCULAR; INTRAVENOUS; SUBCUTANEOUS at 17:08

## 2021-01-11 RX ADMIN — POTASSIUM CHLORIDE 10 MEQ: 750 TABLET, EXTENDED RELEASE ORAL at 08:35

## 2021-01-11 RX ADMIN — ACETAMINOPHEN 650 MG: 325 TABLET, FILM COATED ORAL at 21:30

## 2021-01-11 RX ADMIN — HEPARIN SODIUM AND DEXTROSE 17 UNITS/KG/HR: 10000; 5 INJECTION INTRAVENOUS at 10:35

## 2021-01-11 RX ADMIN — HEPARIN SODIUM 3790 UNITS: 1000 INJECTION INTRAVENOUS; SUBCUTANEOUS at 11:03

## 2021-01-11 RX ADMIN — SODIUM CHLORIDE, SODIUM LACTATE, POTASSIUM CHLORIDE, AND CALCIUM CHLORIDE 75 ML/HR: 600; 310; 30; 20 INJECTION, SOLUTION INTRAVENOUS at 09:30

## 2021-01-11 RX ADMIN — SODIUM CHLORIDE 200 MG: 900 INJECTION, SOLUTION INTRAVENOUS at 21:26

## 2021-01-11 RX ADMIN — QUETIAPINE FUMARATE 400 MG: 200 TABLET ORAL at 08:34

## 2021-01-11 RX ADMIN — INSULIN GLARGINE 22 UNITS: 100 INJECTION, SOLUTION SUBCUTANEOUS at 21:26

## 2021-01-11 RX ADMIN — SODIUM CHLORIDE 10 ML: 9 INJECTION, SOLUTION INTRAMUSCULAR; INTRAVENOUS; SUBCUTANEOUS at 21:26

## 2021-01-11 RX ADMIN — INSULIN LISPRO 2 UNITS: 100 INJECTION, SOLUTION INTRAVENOUS; SUBCUTANEOUS at 08:33

## 2021-01-11 RX ADMIN — Medication 6 MG: at 21:25

## 2021-01-11 RX ADMIN — SODIUM CHLORIDE 150 ML/HR: 900 INJECTION, SOLUTION INTRAVENOUS at 03:20

## 2021-01-11 RX ADMIN — FLUOXETINE 80 MG: 20 CAPSULE ORAL at 08:34

## 2021-01-11 RX ADMIN — OXYCODONE AND ACETAMINOPHEN 1 TABLET: 5; 325 TABLET ORAL at 23:55

## 2021-01-11 RX ADMIN — VANCOMYCIN HYDROCHLORIDE 1500 MG: 10 INJECTION, POWDER, LYOPHILIZED, FOR SOLUTION INTRAVENOUS at 23:56

## 2021-01-11 RX ADMIN — OXYCODONE AND ACETAMINOPHEN 1 TABLET: 5; 325 TABLET ORAL at 06:26

## 2021-01-11 RX ADMIN — SODIUM CHLORIDE 40 MG: 9 INJECTION INTRAMUSCULAR; INTRAVENOUS; SUBCUTANEOUS at 08:33

## 2021-01-11 RX ADMIN — CEFEPIME 2 G: 2 INJECTION, POWDER, FOR SOLUTION INTRAVENOUS at 10:30

## 2021-01-11 NOTE — PROGRESS NOTES
Problem: Diabetes Self-Management  Goal: *Disease process and treatment process  Description: Define diabetes and identify own type of diabetes; list 3 options for treating diabetes. Outcome: Progressing Towards Goal  Goal: *Incorporating nutritional management into lifestyle  Description: Describe effect of type, amount and timing of food on blood glucose; list 3 methods for planning meals. Outcome: Progressing Towards Goal  Goal: *Incorporating physical activity into lifestyle  Description: State effect of exercise on blood glucose levels. Outcome: Progressing Towards Goal  Goal: *Developing strategies to promote health/change behavior  Description: Define the ABC's of diabetes; identify appropriate screenings, schedule and personal plan for screenings. Outcome: Progressing Towards Goal  Goal: *Using medications safely  Description: State effect of diabetes medications on diabetes; name diabetes medication taking, action and side effects. Outcome: Progressing Towards Goal  Goal: *Monitoring blood glucose, interpreting and using results  Description: Identify recommended blood glucose targets  and personal targets. Outcome: Progressing Towards Goal  Goal: *Prevention, detection, treatment of acute complications  Description: List symptoms of hyper- and hypoglycemia; describe how to treat low blood sugar and actions for lowering  high blood glucose level. Outcome: Progressing Towards Goal  Goal: *Prevention, detection and treatment of chronic complications  Description: Define the natural course of diabetes and describe the relationship of blood glucose levels to long term complications of diabetes.   Outcome: Progressing Towards Goal  Goal: *Developing strategies to address psychosocial issues  Description: Describe feelings about living with diabetes; identify support needed and support network  Outcome: Progressing Towards Goal  Goal: *Insulin pump training  Outcome: Progressing Towards Goal  Goal: *Sick day guidelines  Outcome: Progressing Towards Goal  Goal: *Patient Specific Goal (EDIT GOAL, INSERT TEXT)  Outcome: Progressing Towards Goal     Problem: Patient Education: Go to Patient Education Activity  Goal: Patient/Family Education  Outcome: Progressing Towards Goal     Problem: Falls - Risk of  Goal: *Absence of Falls  Description: Document Bard  Fall Risk and appropriate interventions in the flowsheet.   Outcome: Progressing Towards Goal  Note: Fall Risk Interventions:  Mobility Interventions: Communicate number of staff needed for ambulation/transfer         Medication Interventions: Evaluate medications/consider consulting pharmacy    Elimination Interventions: Call light in reach              Problem: Patient Education: Go to Patient Education Activity  Goal: Patient/Family Education  Outcome: Progressing Towards Goal     Problem: Discharge Planning  Goal: *Discharge to safe environment  Outcome: Progressing Towards Goal     Problem: Pain  Goal: *Control of Pain  Outcome: Progressing Towards Goal  Goal: *PALLIATIVE CARE:  Alleviation of Pain  Outcome: Progressing Towards Goal     Problem: Patient Education: Go to Patient Education Activity  Goal: Patient/Family Education  Outcome: Progressing Towards Goal

## 2021-01-11 NOTE — PROGRESS NOTES
conducted an initial consultation and Spiritual Assessment for Valentin Barba, who is a 54 y. o.,male. Patients Primary Language is: [place]. According to the patients EMR Yazidism Affiliation is: No preference. The reason the Patient came to the hospital is:   Patient Active Problem List    Diagnosis Date Noted    PICC line infection 01/09/2021    Septic shock ([facility] 01/09/2021    Acute renal failure (ARF) ([facility] 01/09/2021    Hypomagnesemia 01/09/2021    Type 2 diabetes mellitus (HCC)     Schizoaffective disorder, bipolar type ([facility]     Hyperlipidemia     Infected hardware in right leg ([facility]     Lactic acidosis         The  provided the following Interventions:  Initiated a relationship of care and support with patient in room 2603 today. Listened empathically as patient talked about his being here and his hopes for recovery and a short stay. Provided chaplaincy education. Provided information about Spiritual Care Services. Offered prayer and assurance of continued prayers on patients behalf. Chart reviewed. The following outcomes were achieved:  Patient shared limited information about his medical narrative. Patient processed feeling about current hospitalization. Patient expressed gratitude for pastoral care visit. Assessment:  Patient does not have any Mormonism/cultural needs that will affect patients preferences in health care. There are no further spiritual or Mormonism issues which require Spiritual Care Services interventions at this time. Plan:  Chaplains will continue to follow and will provide pastoral care on an as needed/requested basis    . Jamil Perez   Spiritual Care   [phone]

## 2021-01-11 NOTE — PROGRESS NOTES
Day 2 of attempted PT evaluation. Remains with active bedrest orders. ALos, LLE IV line will need removed prior to OOB mobility. Please update activity level as appropriate to maximize participation with functional mobility for PT evaluation.
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
## 2021-01-11 NOTE — PROGRESS NOTES
Hospitalist Progress Note             Date of Service:  2021  NAME:  Ginger Lizarraga  :  1965  MRN:  [id number]         Assessment & Plan:     Septic shock/septicemia- due to PICC line  - so far just yeast growing in bld cult   - cont brd spec abx with vanco, cefepime  - wbc improved greatly from 29 to 16.6  - ct scan leg w/o contrast reviewed, no acute osteo seen, no abcess  - this am weening off levophed  - lactic acid has improved to 1.8    DM2  - cont lantus, lispro, ins ss    Psych- schizoaffective  - cont prozac, seroquel    Acute Kidney Injury  - cont ivf, trend, improving from 3.2 down to 2.0    Anemia  - suspect due to acute illness and infection  - hgb at 7.2, normocytic  - transfuse < 7    Code status: full  DVT prophylaxis:       Hospital Problems  Never Reviewed          Codes Class Noted POA    PICC line infection ICD-10-CM: T80.219A  ICD-9-CM: 999.31  2021 Unknown        * (Principal) Septic shock ([facility] ICD-10-CM: A41.9, R65.21  ICD-9-CM: 038.9, 785.52, 995.92  2021 Unknown        Type 2 diabetes mellitus (HCC) ICD-10-CM: E11.9  ICD-9-CM: 250.00  Unknown Unknown        Schizoaffective disorder, bipolar type ([facility] ICD-10-CM: F25.0  ICD-9-CM: 295.70  Unknown Unknown        Hyperlipidemia ICD-10-CM: E78.5  ICD-9-CM: 272.4  Unknown Unknown        Infected hardware in right leg ([facility] ICD-10-CM: T84. 7XXA  ICD-9-CM: 996.67  Unknown Unknown        Lactic acidosis ICD-10-CM: E87.2  ICD-9-CM: 276.2  Unknown Unknown        Acute renal failure (ARF) (HCC) ICD-10-CM: N17.9  ICD-9-CM: 584.9  2021 Unknown        Hypomagnesemia ICD-10-CM: D01.86  ICD-9-CM: 275.2  2021 Unknown                Review of Systems:   A comprehensive review of systems was negative except for that written in the HPI. Vital Signs:    Last 24hrs VS reviewed since prior progress note.  Most recent are:  Visit Vitals  BP 100/65   Pulse 97   Temp 97.6 °F (36.4 °C)   Resp 20   Ht 5' 7\" (1.702 m)   Wt 94.8 kg (208 lb 15.9 oz)   SpO2 96%   BMI 32.73 kg/m²         Intake/Output Summary (Last 24 hours) at 1/11/2021 1329  Last data filed at 1/11/2021 1200  Gross per 24 hour   Intake 4306.82 ml   Output 1450 ml   Net 2856.82 ml        Physical Examination:             General:          Alert, cooperative, no distress, appears stated age. HEENT:           Atraumatic, anicteric sclerae, pink conjunctivae                          No oral ulcers, mucosa moist, throat clear, dentition fair  Neck:               Supple, symmetrical  Lungs:             Clear to auscultation bilaterally. No Wheezing or Rhonchi. No rales. Chest wall:      No tenderness  No Accessory muscle use. Heart:              Regular  rhythm,  No  murmur   No edema  Abdomen:        Soft, non-tender. Not distended. Bowel sounds normal  Extremities:     R le sp surgical wounds, healed, tender  Skin:                Not pale. Not Jaundiced  No rashes   Psych:             Not anxious or agitated.   Neurologic:      Alert, moves all extremities, answers questions appropriately and responds to commands        Data Review:    Review and/or order of clinical lab test  Review and/or order of tests in the radiology section of CPT  Review and/or order of tests in the medicine section of CPT      Labs:     Recent Labs     01/11/21  0940 01/11/21  0322   WBC 15.7* 16.6*   HGB 7.4* 7.2*   HCT 23.9* 23.4*    221     Recent Labs     01/11/21  0322 01/10/21  0411 01/09/21  1045    140 135*   K 3.7 5.1 3.8   * 109 100   CO2 19* 20* 24   BUN 32* 27* 15   CREA 2.03* 3.29* 2.49*   * 235* 161*   CA 6.5* 7.0* 8.6   MG  --   --  1.7   PHOS  --   --  1.0*     Recent Labs     01/11/21  0955   ALT 66*   *   TBILI 0.3   TP 5.8*   ALB 1.7*   GLOB 4.1*     Recent Labs     01/11/21  0940 01/10/21  2330 01/09/21  1045   INR 1.6*  --  1.6*   PTP 18.7*  --  18.3*   APTT 60.5* >180.0* 46.4*      No results for input(s): FE, TIBC, PSAT, FERR in the last 72 hours. No results found for: FOL, RBCF   No results for input(s): PH, PCO2, PO2 in the last 72 hours.   Recent Labs     01/09/21  1045   CPK 35*   TROIQ <0.02     No results found for: CHOL, CHOLX, CHLST, CHOLV, HDL, HDLP, LDL, LDLC, DLDLP, TGLX, TRIGL, TRIGP, CHHD, CHHDX  Lab Results   Component Value Date/Time    Glucose (POC) 148 (H) 01/11/2021 11:07 AM    Glucose (POC) 175 (H) 01/11/2021 08:14 AM    Glucose (POC) 157 (H) 01/10/2021 09:27 PM    Glucose (POC) 135 (H) 01/10/2021 05:14 PM    Glucose (POC) 218 (H) 01/10/2021 12:52 PM     Lab Results   Component Value Date/Time    Color DARK YELLOW 01/10/2021 12:40 PM    Appearance CLOUDY 01/10/2021 12:40 PM    Specific gravity 1.018 01/10/2021 12:40 PM    pH (UA) 5.0 01/10/2021 12:40 PM    Protein 30 (A) 01/10/2021 12:40 PM    Glucose Negative 01/10/2021 12:40 PM    Ketone TRACE (A) 01/10/2021 12:40 PM    Bilirubin Negative 01/10/2021 12:40 PM    Urobilinogen 0.2 01/10/2021 12:40 PM    Nitrites Negative 01/10/2021 12:40 PM    Leukocyte Esterase Negative 01/10/2021 12:40 PM    Bacteria 1+ (A) 01/10/2021 12:40 PM    WBC 0 to 3 01/10/2021 12:40 PM    RBC 0 to 1 01/10/2021 12:40 PM         Medications Reviewed:     Current Facility-Administered Medications   Medication Dose Route Frequency    [START ON 1/12/2021] vancomycin (VANCOCIN) 1500 mg in  ml infusion  1,500 mg IntraVENous Q24H    [START ON 1/12/2021] VANCOMYCIN INFORMATION NOTE   Other ONCE    lactated Ringers infusion  75 mL/hr IntraVENous CONTINUOUS    insulin glargine (LANTUS) injection 22 Units  22 Units SubCUTAneous QHS    vasopressin (VASOSTRICT) 20 Units in 0.9% sodium chloride 100 mL infusion  0-0.03 Units/min IntraVENous TITRATE    oxyCODONE-acetaminophen (PERCOCET) 5-325 mg per tablet 1 Tab  1 Tab Oral Q6H PRN    VANCOMYCIN INFORMATION NOTE   Other Rx Dosing/Monitoring    nicotine (NICODERM CQ) 14 mg/24 hr patch 1 Patch  1 Patch TransDERmal DAILY    heparin 25,000 units in D5W 250 ml infusion  18-36 Units/kg/hr IntraVENous TITRATE    cefepime (MAXIPIME) 2 g in 0.9% sodium chloride (MBP/ADV) 100 mL MBP  2 g IntraVENous Q12H    NOREPINephrine (LEVOPHED) 8 mg in 0.9% NS 250ml infusion  0.5-16 mcg/min IntraVENous TITRATE    FLUoxetine (PROzac) capsule 80 mg  80 mg Oral DAILY    QUEtiapine (SEROquel) tablet 400 mg  400 mg Oral Q12H    sodium chloride (NS) flush 5-40 mL  5-40 mL IntraVENous Q8H    sodium chloride (NS) flush 5-40 mL  5-40 mL IntraVENous PRN    acetaminophen (TYLENOL) tablet 650 mg  650 mg Oral Q6H PRN    Or    acetaminophen (TYLENOL) suppository 650 mg  650 mg Rectal Q6H PRN    polyethylene glycol (MIRALAX) packet 17 g  17 g Oral DAILY PRN    promethazine (PHENERGAN) tablet 12.5 mg  12.5 mg Oral Q6H PRN    Or    ondansetron (ZOFRAN) injection 4 mg  4 mg IntraVENous Q6H PRN    pantoprazole (PROTONIX) 40 mg in 0.9% sodium chloride 10 mL injection  40 mg IntraVENous DAILY    insulin lispro (HUMALOG) injection   SubCUTAneous AC&HS    glucose chewable tablet 16 g  4 Tab Oral PRN    glucagon (GLUCAGEN) injection 1 mg  1 mg IntraMUSCular PRN    dextrose (D50) infusion 12.5-25 g  25-50 mL IntraVENous PRN     ______________________________________________________________________  EXPECTED LENGTH OF STAY: - - -  ACTUAL LENGTH OF STAY:          2                 Shelia Delaney MD

## 2021-01-11 NOTE — DIABETES MGMT
Initial Nutrition Assessment and Glycemic Control Plan of Care    Type and Reason for Visit: Initial  Nutrition Recommendations/Plan:   1. No Added Sugar [place] Instant Breakfast BID  2. 1800 calorie consistent CHO diet  Nutrition Assessment:     Pt with BMI - 32.6 kg/m2 (class 1 obesity). Pt appears well nourished but noted poor po intake today. He relates 10 lb weight loss over past 6 weeks. Difficult to assess recent weight changes based on documented weights - current weight is 10 lbs more than weight from one week ago. Malnutrition Assessment:  Malnutrition Status:  No malnutrition    Nutrition History and Allergies: Pt reports he is allergic to cheese. His usual weight is 200 lbs and he states he lost 10 lbs over 6 weeks while at [facility]. Diabetes Management:   Pt reports he has a meter at home and checks his BG TID. Pt was pleased with current A1C and suspects it may be partly due to more restricted eating at [facility]. Recent blood glucose:   1/11/21:  175, 148  1/10/21:  228, 218, 135, 157 - received 4 units corrective lispro    Within target range (non-ICU: <140; ICU<180):  variable  Current Insulin regimen:   22 units Lantus every night  Corrective lispro, normal insulin sensitivity ACHS  Home medication/insulin regimen: pt confirmed:  45 units Lantus BID  6 units lispro at breakfast, lunch, dinner when BG > 200 (pt reports he does not receive this at [facility] but receives sliding scale at when BG > 150 mg/dL)  HbA1c:7.9%  equivalent  to ave Blood Glucose of  177  mg/dl for 2-3 months prior to admission   Adequate glycemic control PTA:   No but pt reports it has improved  Estimated Daily Nutrient Needs:  Energy (kcal): 2089   Protein (g): 87    Fluid (ml/day): 2100;   Nutrition Related Findings:       Pt with PMHx of T2DM, HTN, recent removal of infected RLE hardware admitted to [facility] 1/9/21 for fever and possible left arm midline infection.  Pt reports having a poor appetite at this time IVF:  LR at 75 ml/hr  Wounds:    Surgical incision     Current Nutrition Therapies:  DIET DIABETIC WITH OPTIONS Consistent Carb 1800kcal; Regular  Additional Caloric Sources: none   Anthropometric Measures:  · Height:  5' 7\" (170.2 cm)  · Current Body Wt:  94.8 kg (208 lb 15.9 oz)(1/11/21)    · Usual Body Wt:  90.7 kg (200 lb)     · BMI Category:  Obese class 1 (BMI 30.0-34. 9)     Wt Readings from Last 3 Encounters:   01/11/21 94.3 kg (208 lb)   01/01/21 89.8 kg (198 lb)   12/1/20 weight - 89.8 kg (pulled from EMR)  Nutrition Diagnosis:   · Inadequate energy intake related to early satiety as evidenced by intake 0-25%  · Altered nutrition-related lab values related to endocrine dysfunction as evidenced by lab values(A1C - 7.9%)  · Overweight/obesity related to excessive energy intake as evidenced by BMI(32.6 kg/m2)  Nutrition Interventions:   Food and/or Nutrient Delivery: Modify current diet, Start oral nutrition supplement  Nutrition Education and Counseling: No recommendations at this time  Coordination of Nutrition Care: Continue to monitor while inpatient  Goals:  Blood glucose will be in target range (70 to 180 mg/dL) within 3 days. PO intake will meet >75% of patient estimated nutritional needs within the next 7 days.         Nutrition Monitoring and Evaluation:   Behavioral-Environmental Outcomes: None identified  Food/Nutrient Intake Outcomes: Food and nutrient intake, Supplement intake  Physical Signs/Symptoms Outcomes: Biochemical data, Meal time behavior, Weight  Discharge Planning:    No discharge needs at this time   Electronically signed by Jerod Mayfield RD on 1/11/2021 at 3:34 PM  Contact:   Jerod Mayfield, [address], [facility]   Office[place]  [address] [place] [address] Pager:  [phone]

## 2021-01-11 NOTE — PROGRESS NOTES
Pharmacy Dosing Services: Vancomycin    Indication: Other: PICC line infection    Day of therapy: 2 / x    Other Antimicrobials (Include dose, start day & day of therapy):  n/a  Current Antimicrobial Therapy (168h ago, onward)       Ordered     Start Stop    01/10/21 1253  VANCOMYCIN INFORMATION NOTE  Other,   ONCE      21 1000 21 2159    01/10/21 1253  vancomycin (VANCOCIN) 1500 mg in  ml infusion  1,500 mg,   IntraVENous,   EVERY 36 HOURS      01/10/21 2300 --    01/10/21 1253  VANCOMYCIN INFORMATION NOTE  Other,   RX DOSING/MONITORING      01/10/21 1250 --    21 1057  cefepime (MAXIPIME) 2 g in 0.9% sodium chloride (MBP/ADV) 100 mL MBP  2 g,   IntraVENous,   EVERY 12 HOURS      21 1057 --                    Loading dose (date given): 2000 mg  Current Maintenance dose: 1500 mg IV every 36 hours    Goal Vancomycin Level: Vancomycin Trough: 15 - 20 mcg/mL (most infections)    Vancomycin Level (if drawn): No results for input(s): Mariana Quiñones in the last 72 hours. Pt Weight Weight: 94.8 kg (208 lb 15.9 oz)   Temperature Temp: 97.5 °F (36.4 °C)   Temp (72hrs), Av.4 °F (38.6 °C), Min:97.4 °F (36.3 °C), Max:103.2 °F (39.6 °C)     HR Pulse (Heart Rate): 78   BP BP: 100/67     Recent Labs     21  0322 01/10/21  0411 21  1045   CREA 2.03* 3.29* 2.49*   BUN 32* 27* 15   WBC 16.6* 28.7* 3.2*     Estimated Creatinine Clearance Estimated Creatinine Clearance: 45.1 mL/min (A) (based on SCr of 2.03 mg/dL (H)). Estimated Creatinine Clearance (using IBW): 38.4 mL/min      CAPD, Hemodialysis or Renal Replacement Therapy: N/A  Renal function stable? (unstable defined as SCr increase of 0.5 mg/dL or > 50% increase from baseline, whichever is greater) (Y/N): n     Significant Cultures:   Results       Procedure Component Value Units Date/Time    CULTURE, CATHETER TIP [[id number]] Collected: 21 1630    Order Status: Canceled Specimen: Catheter Tip from PICC     CULTURE, MISC. AEROBIC [[id number]] Collected: 01/09/21 1630    Order Status: Completed Updated: 01/09/21 1718    INFLUENZA A & B AG (RAPID TEST) [[id number]] Collected: 01/09/21 1130    Order Status: Completed Specimen: Nasopharyngeal from Nasal washing Updated: 01/09/21 1201     Influenza A Antigen Negative        Comment: A negative result does not exclude influenza virus infection, seasonal or H1N1 due to suboptimal sensitivity. If influenza is circulating in your community, a diagnosis of influenza should be considered based on a patients clinical presentation and empiric antiviral treatment should be considered, if indicated. Influenza B Antigen Negative       CULTURE, BLOOD [[id number]] Collected: 01/09/21 1051    Order Status: Completed Specimen: Blood Updated: 01/11/21 0759     Special Requests: NO SPECIAL REQUESTS        Culture result: NO GROWTH 2 DAYS       CULTURE, BLOOD [[id number]] Collected: 01/09/21 1045    Order Status: Completed Specimen: Blood Updated: 01/11/21 0759     Special Requests: NO SPECIAL REQUESTS        Culture result: NO GROWTH 2 DAYS       CULTURE, BLOOD [[id number]] Collected: 01/01/21 1832    Order Status: Completed Specimen: Blood Updated: 01/07/21 0756     Special Requests: PERIPHERAL        Culture result: NO GROWTH 6 DAYS       CULTURE, BLOOD [[id number]] Collected: 01/01/21 1830    Order Status: Completed Specimen: Blood Updated: 01/07/21 0756     Special Requests: PERIPHERAL        Culture result: NO GROWTH 6 DAYS                 Regimen assessment:  - Patient given 2000 mg LD on 1/9. Started on vancomycin 1500 mg q36 hours. Renal function greatly improved today. Will adjust dose to 1500 mg IV q24h. Will obtain early trough to ensure clearance  - blood cultures NGTD. Patient with infected PICC line and/or hardware infection. Cultures from PICC line pending. WBC elevated, afebrile. On pressors.   Maintenance dose: 1500 mg IV every 24 hours  Next scheduled level: 1/12 at [facility] 2 will follow daily and adjust medications as appropriate for renal function and/or serum levels.     Thank you,  DAFNE Simms  Clinical Pharmacist  [phone]

## 2021-01-11 NOTE — PROGRESS NOTES
1/11/2021  Lupe Larson reviewed. t being treated for Septic Shock, PICC line infection and now Thrombus - started on Heparin gtt. His plan will be back to [facility] for snf / LTC probably with iv abx. Nathan Dubin P. Annamarie Shells, BSN  [facility]  [phone], Pager [phone]  [email or web address]

## 2021-01-11 NOTE — CONSULTS
Consult Note    Assessment:   · GENE. Etio- volume depletion/septic shock in a setting of catheter related yeast blood stream infection. May have progressed to ischemic atn. · Mild normal ag met acidosis. Lactic acidosis is resolving. · Catheter related yeast bsi with septic shock. On abx per ID. Off pressors. · Acute lt arm catheter related dvt. On heparin drip. · Malnutrition. Recommendations:   · Continue ivf. · Pressors if needed to keep map 65-70. · Continue to hold ace I.   · Avoid NSAID's, IV dye. · Avoid fleets enemas due to concern for acute phosphate nephropathy. · Please dose all medications for approximate creatinine clearance 30-15. Thank you. Consult requested by: Peyman Pearl MD    ADMIT DATE: 1/9/2021  CONSULT DATE: January 11, 2021                 Admission diagnosis: Septic shock [facility])   Reason for Nephrology Consultation: GENE. HPI: Kirstie Claude is a 54 y.o. male [facility] with h/o of dm, htn and h/o of rt tibia fracture in 2012, s/p hardware placement. Over last summer patient fell and a bolt broke loose and was exposed through the skin. Subsequently hardware was removed on 12/3 and patient was sent to SNF to finish course of if ceftriaxone. He at first had picc line in the rt arm but it fell out and about 2 weeks he had new midline placed into lt arm. Patient was seen at the ED on 1/1 with fever and was released. BC were neg. Patient returned back on the day of admission with worsening fever. He was found to be febrile, tachycardic, hypotensive. Patient was started on abx, ivf, pressors, admitted to the icu. Lt midline was pulled, bc just turned out to be pos for yeast. He was also found to have lt arm dvt, started on heparin. No prior h/o of kidney disease. Scr was 1.17 on 1/1/2021. On the day of admission scr was 2.49, ti was up to 3.29 yesterday, 2.03 today.         Past Medical History:   Diagnosis Date    Anxiety     Effusion, right knee  GERD (gastroesophageal reflux disease)     Hyperlipidemia     Hypertension     Infection and inflammatory reaction due to internal orthopedic device, implant, and graft ([facility]     Infection following a procedure, organ and space surgical site, subsequent encounter     Major depressive disorder     PTSD (post-traumatic stress disorder)     Schizoaffective disorder, bipolar type ([facility]     Type 2 diabetes mellitus ([facility]       History reviewed. No pertinent surgical history.     Social History     Socioeconomic History    Marital status: LEGALLY      Spouse name: Not on file    Number of children: Not on file    Years of education: Not on file    Highest education level: Not on file   Occupational History    Not on file   Social Needs    Financial resource strain: Not on file    Food insecurity     Worry: Not on file     Inability: Not on file    Transportation needs     Medical: Not on file     Non-medical: Not on file   Tobacco Use    Smoking status: Current Every Day Smoker     Packs/day: 0.25     Years: 40.00     Pack years: 10.00    Smokeless tobacco: Never Used   Substance and Sexual Activity    Alcohol use: Yes     Comment: socially    Drug use: Not Currently    Sexual activity: Not on file   Lifestyle    Physical activity     Days per week: Not on file     Minutes per session: Not on file    Stress: Not on file   Relationships    Social connections     Talks on phone: Not on file     Gets together: Not on file     Attends Judaism service: Not on file     Active member of club or organization: Not on file     Attends meetings of clubs or organizations: Not on file     Relationship status: Not on file    Intimate partner violence     Fear of current or ex partner: Not on file     Emotionally abused: Not on file     Physically abused: Not on file     Forced sexual activity: Not on file   Other Topics Concern    Not on file   Social History Narrative    Not on file History reviewed. No pertinent family history. Allergies   Allergen Reactions    Aspirin Rash    Cheese Unknown (comments)    Codeine Rash        Home Medications:     Medications Prior to Admission   Medication Sig    acetaminophen (TylenoL) 325 mg tablet Take 650 mg by mouth every six (6) hours as needed for Pain.  insulin lispro (HUMALOG) 100 unit/mL injection by SubCUTAneous route. SLIDING SCALE    cefTRIAXone (ROCEPHIN) 1 gram injection 2 g by IntraVENous route once. IV once a day for wound infection until 1/15/21    docusate sodium (COLACE) 100 mg capsule Take 100 mg by mouth nightly.  insulin lispro (HUMALOG) 100 unit/mL kwikpen 6 Units by SubCUTAneous route Before breakfast, lunch, and dinner.  insulin glargine (Lantus Solostar U-100 Insulin) 100 unit/mL (3 mL) inpn 45 Units by SubCUTAneous route ACB/HS.  atorvastatin (Lipitor) 80 mg tablet Take 80 mg by mouth daily.  lisinopriL (PRINIVIL, ZESTRIL) 5 mg tablet Take  by mouth daily.  enoxaparin (Lovenox) 40 mg/0.4 mL 40 mg by SubCUTAneous route daily.  gabapentin (Neurontin) 800 mg tablet Take 800 mg by mouth four (4) times daily.  oxyCODONE IR (ROXICODONE) 5 mg immediate release tablet Take 10 mg by mouth every six (6) hours as needed for Pain.  pantoprazole (Protonix) 40 mg granules for oral suspension Take 40 mg by mouth two (2) times a day.  FLUoxetine (PROzac) 40 mg capsule Take 80 mg by mouth daily.  QUEtiapine (SEROqueL) 400 mg tablet Take 400 mg by mouth every twelve (12) hours.        Current Inpatient Medications:     Current Facility-Administered Medications   Medication Dose Route Frequency    [START ON 1/12/2021] vancomycin (VANCOCIN) 1500 mg in  ml infusion  1,500 mg IntraVENous Q24H    [START ON 1/12/2021] VANCOMYCIN INFORMATION NOTE   Other ONCE    lactated Ringers infusion  75 mL/hr IntraVENous CONTINUOUS    insulin glargine (LANTUS) injection 22 Units  22 Units SubCUTAneous QHS    vasopressin (VASOSTRICT) 20 Units in 0.9% sodium chloride 100 mL infusion  0-0.03 Units/min IntraVENous TITRATE    oxyCODONE-acetaminophen (PERCOCET) 5-325 mg per tablet 1 Tab  1 Tab Oral Q6H PRN    VANCOMYCIN INFORMATION NOTE   Other Rx Dosing/Monitoring    nicotine (NICODERM CQ) 14 mg/24 hr patch 1 Patch  1 Patch TransDERmal DAILY    heparin 25,000 units in D5W 250 ml infusion  18-36 Units/kg/hr IntraVENous TITRATE    cefepime (MAXIPIME) 2 g in 0.9% sodium chloride (MBP/ADV) 100 mL MBP  2 g IntraVENous Q12H    NOREPINephrine (LEVOPHED) 8 mg in 0.9% NS 250ml infusion  0.5-16 mcg/min IntraVENous TITRATE    FLUoxetine (PROzac) capsule 80 mg  80 mg Oral DAILY    QUEtiapine (SEROquel) tablet 400 mg  400 mg Oral Q12H    sodium chloride (NS) flush 5-40 mL  5-40 mL IntraVENous Q8H    sodium chloride (NS) flush 5-40 mL  5-40 mL IntraVENous PRN    acetaminophen (TYLENOL) tablet 650 mg  650 mg Oral Q6H PRN    Or    acetaminophen (TYLENOL) suppository 650 mg  650 mg Rectal Q6H PRN    polyethylene glycol (MIRALAX) packet 17 g  17 g Oral DAILY PRN    promethazine (PHENERGAN) tablet 12.5 mg  12.5 mg Oral Q6H PRN    Or    ondansetron (ZOFRAN) injection 4 mg  4 mg IntraVENous Q6H PRN    pantoprazole (PROTONIX) 40 mg in 0.9% sodium chloride 10 mL injection  40 mg IntraVENous DAILY    insulin lispro (HUMALOG) injection   SubCUTAneous AC&HS    glucose chewable tablet 16 g  4 Tab Oral PRN    glucagon (GLUCAGEN) injection 1 mg  1 mg IntraMUSCular PRN    dextrose (D50) infusion 12.5-25 g  25-50 mL IntraVENous PRN       Review of Systems:   No sore throat. No cough or hemoptysis. No shortness of breath or chest pain. No orthopnea or paroxysmal nocturnal dyspnea. Poor appetite. No nausea, vomiting, abdominal pain, melena or hematochezia. No constipation or diarrhea. No dysuria, no gross hematuria of voiding difficulties. No ankle swelling, no joint paints. No muscle aches. No skin changes.  C/o dizziness and lightheadedness. No headaches. Physical Assessment:     Vitals:    01/11/21 0320 01/11/21 0400 01/11/21 0500 01/11/21 0600   BP:  98/64 101/67 100/67   Pulse:  80 76 78   Resp:  14 13 14   Temp:  97.5 °F (36.4 °C)     SpO2:  96% 96% 95%   Weight: 94.8 kg (208 lb 15.9 oz)      Height:         Last 3 Recorded Weights in this Encounter    01/09/21 1104 01/09/21 2330 01/11/21 0320   Weight: 99.8 kg (220 lb) 90.2 kg (198 lb 12.8 oz) 94.8 kg (208 lb 15.9 oz)     Admission weight: Weight: 81.6 kg (180 lb) (01/09/21 1030)      Intake/Output Summary (Last 24 hours) at 1/11/2021 1128  Last data filed at 1/11/2021 0549  Gross per 24 hour   Intake 3671.72 ml   Output 2250 ml   Net 1421.72 ml       Patient is in no apparent distress. HEENT: Head is normocephalic and atraumatic. Pupils are round, equal, reactive to light. Sclerae are anicteric. Oropharynx clear. Neck: no cervical lymphadenopathy or thyromegaly. Lungs: good air entry, clear to auscultation bilaterally. Trachea at the midline. Cardiovascular system: S1, S2, regular rate and rhythm. No murmurs, gallops or rubs. No jvd. Carotid upstroke 2 + bilaterally. Abdomen: soft, non tender, non distended. Positive bowel sounds. No hepatosplenomegaly. No abdominal bruits. Bentley in place. Extremities: no clubbing, cyanosis or edema. Strong dorsalis pedis pulses. Brisk capillary refill on the toes bilaterally. Lt arm is edematous with mild erythema in the antecubital area. Lt femoral central line. Integumentary: skin is grossly intact. Rt le tuberous area with wilfredo of eschar, no purulence. Neurologic: Alert, oriented time three. Cooperative and appropriate. No gross motor or sensory deficits.        Data Review:    Labs: Results:       Chemistry Recent Labs     01/11/21  0955 01/11/21  0322 01/10/21  0411 01/09/21  1045   GLU  --  151* 235* 161*   NA  --  143 140 135*   K  --  3.7 5.1 3.8   CL  --  114* 109 100   CO2  --  19* 20* 24   BUN  --  32* 27* 15   CREA  --  2.03* 3.29* 2.49*   CA  --  6.5* 7.0* 8.6   AGAP  --  10 11 11   BUCR  --  16 8* 6*   *  --   --   --    TP 5.8*  --   --   --    ALB 1.7*  --   --   --    GLOB 4.1*  --   --   --    AGRAT 0.4*  --   --   --    PHOS  --   --   --  1.0*         CBC w/Diff Recent Labs     01/11/21  0940 01/11/21 0322 01/10/21  0411   WBC 15.7* 16.6* 28.7*   RBC 2.66* 2.60* 3.16*   HGB 7.4* 7.2* 8.7*   HCT 23.9* 23.4* 28.9*    221 228   GRANS 79* 80* 63   LYMPH 17* 15* 8*   EOS 1 1 0         Iron/Ferritin No results for input(s): IRON in the last 72 hours. No lab exists for component: TIBCCALC   PTH/VIT D No results for input(s): PTH in the last 72 hours.     No lab exists for component: VITD           Isrrael Marrero M.D  [facility]  Office [phone]  Pager [phone]    January 11, 2021

## 2021-01-11 NOTE — PROGRESS NOTES
0730-Received pt resting in bed with eyes open, watching tv, no sign of distress, vss on Levophed, vasopressin, and heparin drip, will continue to monitor  1000-Rounded on pt with treatment team  1200-Pt off pressors, tolerating well at this time  1400-Pt resting in bed with eyes closed, continues to have poor appetite, BP stable off pressors, no signs of bleeding  1600-Resting in bed with eyes closed, no sign of distress  1800-No sign of distress, vss remain stable off pressors, pt complaining of insomnia x2days, plan to administer melatonin tonight  1925-Bedside and Verbal shift change report given to Indra [phone] (oncoming nurse) by Luis F Burk RN (offgoing nurse). Report included the following information SBAR, Kardex, Intake/Output, MAR, Recent Results and Cardiac Rhythm SR/ST.

## 2021-01-11 NOTE — PROGRESS NOTES
Problem: Diabetes Self-Management  Goal: *Disease process and treatment process  Description: Define diabetes and identify own type of diabetes; list 3 options for treating diabetes. Outcome: Progressing Towards Goal  Goal: *Incorporating nutritional management into lifestyle  Description: Describe effect of type, amount and timing of food on blood glucose; list 3 methods for planning meals. Outcome: Progressing Towards Goal  Goal: *Incorporating physical activity into lifestyle  Description: State effect of exercise on blood glucose levels. Outcome: Progressing Towards Goal  Goal: *Developing strategies to promote health/change behavior  Description: Define the ABC's of diabetes; identify appropriate screenings, schedule and personal plan for screenings. Outcome: Progressing Towards Goal  Goal: *Using medications safely  Description: State effect of diabetes medications on diabetes; name diabetes medication taking, action and side effects. Outcome: Progressing Towards Goal  Goal: *Monitoring blood glucose, interpreting and using results  Description: Identify recommended blood glucose targets  and personal targets. Outcome: Progressing Towards Goal  Goal: *Prevention, detection, treatment of acute complications  Description: List symptoms of hyper- and hypoglycemia; describe how to treat low blood sugar and actions for lowering  high blood glucose level. Outcome: Progressing Towards Goal  Goal: *Prevention, detection and treatment of chronic complications  Description: Define the natural course of diabetes and describe the relationship of blood glucose levels to long term complications of diabetes.   Outcome: Progressing Towards Goal  Goal: *Developing strategies to address psychosocial issues  Description: Describe feelings about living with diabetes; identify support needed and support network  Outcome: Progressing Towards Goal  Goal: *Insulin pump training  Outcome: Progressing Towards Goal  Goal: *Sick day guidelines  Outcome: Progressing Towards Goal  Goal: *Patient Specific Goal (EDIT GOAL, INSERT TEXT)  Outcome: Progressing Towards Goal     Problem: Patient Education: Go to Patient Education Activity  Goal: Patient/Family Education  Outcome: Progressing Towards Goal     Problem: Falls - Risk of  Goal: *Absence of Falls  Description: Document Jackelin Giordano Fall Risk and appropriate interventions in the flowsheet.   Outcome: Progressing Towards Goal  Note: Fall Risk Interventions:  Mobility Interventions: Communicate number of staff needed for ambulation/transfer         Medication Interventions: Evaluate medications/consider consulting pharmacy    Elimination Interventions: Call light in reach              Problem: Patient Education: Go to Patient Education Activity  Goal: Patient/Family Education  Outcome: Progressing Towards Goal     Problem: Discharge Planning  Goal: *Discharge to safe environment  Outcome: Progressing Towards Goal     Problem: Pain  Goal: *Control of Pain  Outcome: Progressing Towards Goal  Goal: *PALLIATIVE CARE:  Alleviation of Pain  Outcome: Progressing Towards Goal     Problem: Patient Education: Go to Patient Education Activity  Goal: Patient/Family Education  Outcome: Progressing Towards Goal

## 2021-01-11 NOTE — ROUTINE PROCESS
Assumed care of patient,  Resting in bed. Heparin/levophed/vasopressin infusing. Bentley in place. 2045 telephone doesn't work in room. Patient moved to new room 0000 VSS. No changes 0204 called lab to find results to ptt I sent down at midnight. Was informed by Kody How that it was critical.  Lab value placed in chart as resulting at 2330, but it did not result until 0204 
0323 labs drawn 
0400 VSS. No changes 0538 H&H 7.2/23. Dr Rebecca Comer aware. Will monitor Bedside shift change report given to dada (oncoming nurse) by Toney Fabry, RN 
 (offgoing nurse). Report included the following information SBAR, MAR and Recent Results.
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
## 2021-01-12 ENCOUNTER — APPOINTMENT (OUTPATIENT)
Dept: GENERAL RADIOLOGY | Age: 56
DRG: 721 | End: 2021-01-12
Attending: INTERNAL MEDICINE
Payer: MEDICAID

## 2021-01-12 LAB
ALBUMIN SERPL-MCNC: 1.7 G/DL (ref 3.4–5)
ANION GAP SERPL CALC-SCNC: 8 MMOL/L (ref 3–18)
APTT PPP: 88.4 SEC (ref 23–36.4)
BASOPHILS # BLD: 0 K/UL (ref 0–0.1)
BASOPHILS NFR BLD: 0 % (ref 0–2)
BUN SERPL-MCNC: 18 MG/DL (ref 7–18)
BUN/CREAT SERPL: 16 (ref 12–20)
CALCIUM SERPL-MCNC: 7.1 MG/DL (ref 8.5–10.1)
CHLORIDE SERPL-SCNC: 114 MMOL/L (ref 100–111)
CO2 SERPL-SCNC: 22 MMOL/L (ref 21–32)
CREAT SERPL-MCNC: 1.15 MG/DL (ref 0.6–1.3)
DIFFERENTIAL METHOD BLD: ABNORMAL
ECHO AO ASC DIAM: 3.61 CM
ECHO AO ROOT DIAM: 3.38 CM
ECHO IVC PROX: 2.79 CM
ECHO LA AREA 4C: 25.13 CM2
ECHO LA MAJOR AXIS: 4.5 CM
ECHO LA MINOR AXIS: 2.19 CM
ECHO LA VOL 2C: 67.92 ML (ref 18–58)
ECHO LA VOL 4C: 85.69 ML (ref 18–58)
ECHO LA VOL BP: 85.9 ML (ref 18–58)
ECHO LA VOL/BSA BIPLANE: 41.78 ML/M2 (ref 16–28)
ECHO LA VOLUME INDEX A2C: 33.03 ML/M2 (ref 16–28)
ECHO LA VOLUME INDEX A4C: 41.68 ML/M2 (ref 16–28)
ECHO LV E' LATERAL VELOCITY: 9.26 CM/S
ECHO LV E' SEPTAL VELOCITY: 8.72 CM/S
ECHO LV EDV A2C: 120.05 ML
ECHO LV EDV A4C: 119.54 ML
ECHO LV EDV BP: 120.08 ML (ref 67–155)
ECHO LV EDV INDEX A4C: 58.1 ML/M2
ECHO LV EDV INDEX BP: 58.4 ML/M2
ECHO LV EDV NDEX A2C: 58.4 ML/M2
ECHO LV EJECTION FRACTION A2C: 51 PERCENT
ECHO LV EJECTION FRACTION A4C: 46 PERCENT
ECHO LV EJECTION FRACTION BIPLANE: 48.8 PERCENT (ref 55–100)
ECHO LV ESV A2C: 58.27 ML
ECHO LV ESV A4C: 64.55 ML
ECHO LV ESV BP: 61.48 ML (ref 22–58)
ECHO LV ESV INDEX A2C: 28.3 ML/M2
ECHO LV ESV INDEX A4C: 31.4 ML/M2
ECHO LV ESV INDEX BP: 29.9 ML/M2
ECHO LV INTERNAL DIMENSION DIASTOLIC: 5.82 CM (ref 4.2–5.9)
ECHO LV INTERNAL DIMENSION SYSTOLIC: 5 CM
ECHO LV IVSD: 0.81 CM (ref 0.6–1)
ECHO LV MASS 2D: 165.6 G (ref 88–224)
ECHO LV MASS INDEX 2D: 80.5 G/M2 (ref 49–115)
ECHO LV POSTERIOR WALL DIASTOLIC: 0.71 CM (ref 0.6–1)
ECHO LVOT DIAM: 2.35 CM
ECHO LVOT PEAK GRADIENT: 2.78 MMHG
ECHO LVOT SV: 61.8 ML
ECHO LVOT SV: 70.1 ML
ECHO LVOT VTI: 16.15 CM
ECHO MV A VELOCITY: 78.92 CM/S
ECHO MV AREA PISA: 0.15 CM2
ECHO MV E DECELERATION TIME (DT): 116.83 MS
ECHO MV E VELOCITY: 105.12 CM/S
ECHO MV E/A RATIO: 1.33
ECHO MV E/E' LATERAL: 11.35
ECHO MV E/E' RATIO (AVERAGED): 11.7
ECHO MV E/E' SEPTAL: 12.06
ECHO MV REGURGITANT RADIUS PISA: 0.56 CM
ECHO MV REGURGITANT VOLUME: 21.84 ML
ECHO MV REGURGITANT VTIA: 142.7 CM
ECHO PV REGURGITANT MAX VELOCITY: 289.57 CM/S
ECHO PV REGURGITANT MAX VELOCITY: 477.92 CM/S
ECHO RV TAPSE: 1.85 CM (ref 1.5–2)
ECHO TV REGURGITANT MAX VELOCITY: 295 CM/S
ECHO TV REGURGITANT PEAK GRADIENT: 34.7 MMHG
EOSINOPHIL # BLD: 0.3 K/UL (ref 0–0.4)
EOSINOPHIL NFR BLD: 3 % (ref 0–5)
ERYTHROCYTE [DISTWIDTH] IN BLOOD BY AUTOMATED COUNT: 15.4 % (ref 11.6–14.5)
GLUCOSE BLD STRIP.AUTO-MCNC: 105 MG/DL (ref 70–110)
GLUCOSE BLD STRIP.AUTO-MCNC: 122 MG/DL (ref 70–110)
GLUCOSE BLD STRIP.AUTO-MCNC: 96 MG/DL (ref 70–110)
GLUCOSE BLD STRIP.AUTO-MCNC: 98 MG/DL (ref 70–110)
GLUCOSE SERPL-MCNC: 85 MG/DL (ref 74–99)
HCT VFR BLD AUTO: 24.7 % (ref 36–48)
HGB BLD-MCNC: 7.5 G/DL (ref 13–16)
LVOT MG: 1.66 MMHG
LYMPHOCYTES # BLD: 2.3 K/UL (ref 0.9–3.6)
LYMPHOCYTES NFR BLD: 18 % (ref 21–52)
MAGNESIUM SERPL-MCNC: 1.9 MG/DL (ref 1.6–2.6)
MCH RBC QN AUTO: 27.3 PG (ref 24–34)
MCHC RBC AUTO-ENTMCNC: 30.4 G/DL (ref 31–37)
MCV RBC AUTO: 89.8 FL (ref 74–97)
MONOCYTES # BLD: 0.3 K/UL (ref 0.05–1.2)
MONOCYTES NFR BLD: 2 % (ref 3–10)
NEUTS SEG # BLD: 9.7 K/UL (ref 1.8–8)
NEUTS SEG NFR BLD: 77 % (ref 40–73)
PHOSPHATE SERPL-MCNC: 3.1 MG/DL (ref 2.5–4.9)
PLATELET # BLD AUTO: 237 K/UL (ref 135–420)
PMV BLD AUTO: 10.3 FL (ref 9.2–11.8)
POTASSIUM SERPL-SCNC: 3.7 MMOL/L (ref 3.5–5.5)
RBC # BLD AUTO: 2.75 M/UL (ref 4.7–5.5)
SODIUM SERPL-SCNC: 144 MMOL/L (ref 136–145)
WBC # BLD AUTO: 12.6 K/UL (ref 4.6–13.2)

## 2021-01-12 PROCEDURE — 74011636637 HC RX REV CODE- 636/637: Performed by: INTERNAL MEDICINE

## 2021-01-12 PROCEDURE — 74011250636 HC RX REV CODE- 250/636: Performed by: INTERNAL MEDICINE

## 2021-01-12 PROCEDURE — 65660000001 HC RM ICU INTERMED STEPDOWN

## 2021-01-12 PROCEDURE — 74011250636 HC RX REV CODE- 250/636: Performed by: HOSPITALIST

## 2021-01-12 PROCEDURE — 83735 ASSAY OF MAGNESIUM: CPT

## 2021-01-12 PROCEDURE — 71045 X-RAY EXAM CHEST 1 VIEW: CPT

## 2021-01-12 PROCEDURE — 74011250637 HC RX REV CODE- 250/637: Performed by: INTERNAL MEDICINE

## 2021-01-12 PROCEDURE — C9113 INJ PANTOPRAZOLE SODIUM, VIA: HCPCS | Performed by: HOSPITALIST

## 2021-01-12 PROCEDURE — 80202 ASSAY OF VANCOMYCIN: CPT

## 2021-01-12 PROCEDURE — 74011000250 HC RX REV CODE- 250: Performed by: HOSPITALIST

## 2021-01-12 PROCEDURE — 74011000258 HC RX REV CODE- 258: Performed by: INTERNAL MEDICINE

## 2021-01-12 PROCEDURE — 99291 CRITICAL CARE FIRST HOUR: CPT | Performed by: INTERNAL MEDICINE

## 2021-01-12 PROCEDURE — 85025 COMPLETE CBC W/AUTO DIFF WBC: CPT

## 2021-01-12 PROCEDURE — 74011000258 HC RX REV CODE- 258: Performed by: HOSPITALIST

## 2021-01-12 PROCEDURE — 85730 THROMBOPLASTIN TIME PARTIAL: CPT

## 2021-01-12 PROCEDURE — 82962 GLUCOSE BLOOD TEST: CPT

## 2021-01-12 PROCEDURE — 2709999900 HC NON-CHARGEABLE SUPPLY

## 2021-01-12 PROCEDURE — 80069 RENAL FUNCTION PANEL: CPT

## 2021-01-12 PROCEDURE — 74011250637 HC RX REV CODE- 250/637: Performed by: HOSPITALIST

## 2021-01-12 RX ORDER — POTASSIUM CHLORIDE 20 MEQ/1
20 TABLET, EXTENDED RELEASE ORAL ONCE
Status: COMPLETED | OUTPATIENT
Start: 2021-01-12 | End: 2021-01-12

## 2021-01-12 RX ORDER — POTASSIUM CHLORIDE 20 MEQ/1
40 TABLET, EXTENDED RELEASE ORAL
Status: COMPLETED | OUTPATIENT
Start: 2021-01-12 | End: 2021-01-12

## 2021-01-12 RX ORDER — FUROSEMIDE 10 MG/ML
40 INJECTION INTRAMUSCULAR; INTRAVENOUS
Status: COMPLETED | OUTPATIENT
Start: 2021-01-12 | End: 2021-01-12

## 2021-01-12 RX ADMIN — VANCOMYCIN HYDROCHLORIDE 1500 MG: 10 INJECTION, POWDER, LYOPHILIZED, FOR SOLUTION INTRAVENOUS at 23:58

## 2021-01-12 RX ADMIN — HEPARIN SODIUM AND DEXTROSE 19 UNITS/KG/HR: 10000; 5 INJECTION INTRAVENOUS at 03:24

## 2021-01-12 RX ADMIN — SODIUM CHLORIDE 100 MG: 900 INJECTION, SOLUTION INTRAVENOUS at 21:38

## 2021-01-12 RX ADMIN — HEPARIN SODIUM AND DEXTROSE 19 UNITS/KG/HR: 10000; 5 INJECTION INTRAVENOUS at 19:24

## 2021-01-12 RX ADMIN — SODIUM CHLORIDE 40 MG: 9 INJECTION INTRAMUSCULAR; INTRAVENOUS; SUBCUTANEOUS at 09:06

## 2021-01-12 RX ADMIN — SODIUM CHLORIDE 10 ML: 9 INJECTION, SOLUTION INTRAMUSCULAR; INTRAVENOUS; SUBCUTANEOUS at 14:52

## 2021-01-12 RX ADMIN — QUETIAPINE FUMARATE 400 MG: 200 TABLET ORAL at 09:06

## 2021-01-12 RX ADMIN — FUROSEMIDE 40 MG: 10 INJECTION, SOLUTION INTRAMUSCULAR; INTRAVENOUS at 10:19

## 2021-01-12 RX ADMIN — QUETIAPINE FUMARATE 400 MG: 200 TABLET ORAL at 21:38

## 2021-01-12 RX ADMIN — OXYCODONE AND ACETAMINOPHEN 1 TABLET: 5; 325 TABLET ORAL at 17:07

## 2021-01-12 RX ADMIN — CEFEPIME 2 G: 2 INJECTION, POWDER, FOR SOLUTION INTRAVENOUS at 10:20

## 2021-01-12 RX ADMIN — SODIUM CHLORIDE 10 ML: 9 INJECTION, SOLUTION INTRAMUSCULAR; INTRAVENOUS; SUBCUTANEOUS at 21:56

## 2021-01-12 RX ADMIN — POTASSIUM CHLORIDE 20 MEQ: 1500 TABLET, EXTENDED RELEASE ORAL at 09:06

## 2021-01-12 RX ADMIN — CEFEPIME 2 G: 2 INJECTION, POWDER, FOR SOLUTION INTRAVENOUS at 23:36

## 2021-01-12 RX ADMIN — SODIUM CHLORIDE 10 ML: 9 INJECTION, SOLUTION INTRAMUSCULAR; INTRAVENOUS; SUBCUTANEOUS at 06:35

## 2021-01-12 RX ADMIN — OXYCODONE AND ACETAMINOPHEN 1 TABLET: 5; 325 TABLET ORAL at 23:37

## 2021-01-12 RX ADMIN — OXYCODONE AND ACETAMINOPHEN 1 TABLET: 5; 325 TABLET ORAL at 05:24

## 2021-01-12 RX ADMIN — POTASSIUM CHLORIDE 40 MEQ: 1500 TABLET, EXTENDED RELEASE ORAL at 14:51

## 2021-01-12 RX ADMIN — OXYCODONE AND ACETAMINOPHEN 1 TABLET: 5; 325 TABLET ORAL at 11:10

## 2021-01-12 RX ADMIN — INSULIN GLARGINE 22 UNITS: 100 INJECTION, SOLUTION SUBCUTANEOUS at 23:37

## 2021-01-12 RX ADMIN — SODIUM CHLORIDE, SODIUM LACTATE, POTASSIUM CHLORIDE, AND CALCIUM CHLORIDE 25 ML/HR: 600; 310; 30; 20 INJECTION, SOLUTION INTRAVENOUS at 23:43

## 2021-01-12 NOTE — PROGRESS NOTES
Problem: Falls - Risk of  Goal: *Absence of Falls  Description: Document Klaus Cool Fall Risk and appropriate interventions in the flowsheet.   Outcome: Not Progressing Towards Goal  Note: Fall Risk Interventions:  Mobility Interventions: Communicate number of staff needed for ambulation/transfer         Medication Interventions: Evaluate medications/consider consulting pharmacy    Elimination Interventions: Call light in reach, Patient to call for help with toileting needs

## 2021-01-12 NOTE — PROGRESS NOTES
Day 3 of attempts for PT evaluation. Remains with active bedrest orders and LLE femoral line. PT evaluation contraindicated. Discontinuing PT orders. Please re-order when patient appropriate for OOB mobility for PT evaluation.

## 2021-01-12 NOTE — PROGRESS NOTES
RENAL PROGRESS NOTE        Vicente Asif         Assessment/Plan:   · GENE. Etio- volume depletion/septic shock in a setting of catheter related yeast blood stream infection. Renal function is improving, good uo. Agree with reducing ivf given improving oral intake. · Mild normal ag met acidosis. Corrected. · Catheter related yeast bsi with septic shock. On abx per ID. Off pressors. · Acute lt arm catheter related dvt. On heparin drip. · Malnutrition. Subjective:  Patient complaints off: Feels better. No SOB/CP/N/V. Tolerates diet. Patient Active Problem List   Diagnosis Code    PICC line infection T80.219A    Septic shock ([address] A41.9, R65.21    Type 2 diabetes mellitus ([address] E11.9    Schizoaffective disorder, bipolar type ([address] F25.0    Hyperlipidemia E78.5    Infected hardware in right leg ([address] T84. 7XXA    Lactic acidosis E87.2    Acute renal failure (ARF) (HCC) N17.9    Hypomagnesemia E83.42       Current Facility-Administered Medications   Medication Dose Route Frequency Provider Last Rate Last Admin    vancomycin (VANCOCIN) 1500 mg in  ml infusion  1,500 mg IntraVENous Q24H Salvador Chinchilla  mL/hr at 01/11/21 2356 1,500 mg at 01/11/21 2356    VANCOMYCIN INFORMATION NOTE   Other Reymundo Amaral MD        lactated Ringers infusion  25 mL/hr IntraVENous CONTINUOUS Clifton DIAS MD 25 mL/hr at 01/12/21 0933 25 mL/hr at 01/12/21 0933    insulin glargine (LANTUS) injection 22 Units  22 Units SubCUTAneous QHS Zachariah Suleman Manjarrez MD   22 Units at 01/11/21 2126    anidulafungin (ERAXIS) 100 mg in 0.9% sodium chloride 130 mL IVPB  100 mg IntraVENous Q24H Romulo, Karan Blizzard, MD        melatonin tablet 6 mg  6 mg Oral QHS PRN Salvador Chinchilla MD   6 mg at 01/11/21 2125    vasopressin (VASOSTRICT) 20 Units in 0.9% sodium chloride 100 mL infusion 0-0.03 Units/min IntraVENous TITRATE Osmany Mcgrath MD   Stopped at 01/11/21 1044    oxyCODONE-acetaminophen (PERCOCET) 5-325 mg per tablet 1 Tab  1 Tab Oral Q6H PRN Osmany Mcgrath MD   1 Tab at 01/12/21 9294    VANCOMYCIN INFORMATION NOTE   Other Rx Dosing/Monitoring Pamla Stakes, DO        nicotine (NICODERM CQ) 14 mg/24 hr patch 1 Patch  1 Patch TransDERmal DAILY Pollyann Sensing H, DO   1 Patch at 01/11/21 1707    heparin 25,000 units in D5W 250 ml infusion  18-36 Units/kg/hr IntraVENous TITRATE Osmany Mcgrath MD 17.1 mL/hr at 01/12/21 0749 19 Units/kg/hr at 01/12/21 0749    cefepime (MAXIPIME) 2 g in 0.9% sodium chloride (MBP/ADV) 100 mL MBP  2 g IntraVENous Q12H Pollyann Sensing H,  mL/hr at 01/12/21 1020 2 g at 01/12/21 1020    NOREPINephrine (LEVOPHED) 8 mg in 0.9% NS 250ml infusion  0.5-16 mcg/min IntraVENous TITRATE Osmany Mcgrath MD   Stopped at 01/11/21 0902    FLUoxetine (PROzac) capsule 80 mg  80 mg Oral DAILY Pollyann Sensing H, DO   80 mg at 01/11/21 5386    QUEtiapine (SEROquel) tablet 400 mg  400 mg Oral Q12H Pollyann Sensing H, DO   400 mg at 01/12/21 6637    sodium chloride (NS) flush 5-40 mL  5-40 mL IntraVENous Q8H Pollyann Sensing H, DO   10 mL at 01/12/21 1797    sodium chloride (NS) flush 5-40 mL  5-40 mL IntraVENous PRN Pamla Stakes, DO        acetaminophen (TYLENOL) tablet 650 mg  650 mg Oral Q6H PRN Pamla Stakes, DO   650 mg at 01/11/21 2130    Or    acetaminophen (TYLENOL) suppository 650 mg  650 mg Rectal Q6H PRN Pamla Stakes, DO        polyethylene glycol (MIRALAX) packet 17 g  17 g Oral DAILY PRN Pamla Stakes, DO        promethazine (PHENERGAN) tablet 12.5 mg  12.5 mg Oral Q6H PRN Pollyann Sensing H, DO        Or    ondansetron [facility]) injection 4 mg  4 mg IntraVENous Q6H PRN Guy Mata,         pantoprazole (PROTONIX) 40 mg in 0.9% sodium chloride 10 mL injection  40 mg IntraVENous DAILY Santana Mcmanus Faizan SMITH DO   40 mg at 01/12/21 7027    insulin lispro (HUMALOG) injection   SubCUTAneous AC&HS Francetta Favre, DO   Stopped at 01/11/21 1130    glucose chewable tablet 16 g  4 Tab Oral PRN Francetta Favre, DO        glucagon (GLUCAGEN) injection 1 mg  1 mg IntraMUSCular PRN Francetta Favre, DO        dextrose (D50) infusion 12.5-25 g  25-50 mL IntraVENous PRN Luis Enrique SMITH DO           Objective  Vitals:    01/12/21 0600 01/12/21 0700 01/12/21 0800 01/12/21 0900   BP: 115/78 113/80 119/80 124/83   Pulse: 96 95 95 (!) 104   Resp: 16 16 14 17   Temp:   97.8 °F (36.6 °C)    SpO2: 95% 95% 95% 94%   Weight:       Height:             Intake/Output Summary (Last 24 hours) at 1/12/2021 1050  Last data filed at 1/12/2021 0945  Gross per 24 hour   Intake 2759.55 ml   Output 4850 ml   Net -2090.45 ml           Admission weight: Weight: 81.6 kg (180 lb) (01/09/21 1030)  Last Weight Metrics:  Weight Loss Metrics 1/11/2021 1/1/2021   Today's Wt 208 lb 198 lb   BMI 32.58 kg/m2 31.01 kg/m2             Physical Assessment:     General: NAD, alert and oriented. Neck: No jvd. LUNGS: Clear to Auscultation, No rales, rhonchi or wheezes. CVS EXM: S1, S2  RRR, no murmurs/gallops/rubs. Abdomen: soft, non tender. Lower Extremities:  trace edema.        Lab    CBC w/Diff Recent Labs     01/12/21  0325 01/11/21  1700 01/11/21  0940 01/11/21  0322   WBC 12.6  --  15.7* 16.6*   RBC 2.75*  --  2.66* 2.60*   HGB 7.5* 7.7* 7.4* 7.2*   HCT 24.7* 25.4* 23.9* 23.4*     --  211 221   GRANS 77*  --  79* 80*   LYMPH 18*  --  17* 15*   EOS 3  --  1 1        Chemistry Recent Labs     01/12/21 0325 01/11/21  0955 01/11/21  0322 01/10/21  0411   GLU 85  --  151* 235*     --  143 140   K 3.7  --  3.7 5.1   *  --  114* 109   CO2 22  --  19* 20*   BUN 18  --  32* 27*   CREA 1.15  --  2.03* 3.29*   CA 7.1*  --  6.5* 7.0*   AGAP 8  --  10 11   BUCR 16  --  16 8*   AP  --  288*  --   --    TP  --  5.8*  -- --    ALB 1.7* 1.7*  --   --    GLOB  --  4.1*  --   --    AGRAT  --  0.4*  --   --    PHOS 3.1  --   --   --          No results found for: IRON, FE, TIBC, IBCT, PSAT, FERR   Lab Results   Component Value Date/Time    Calcium 7.1 (L) 01/12/2021 03:25 AM    Phosphorus 3.1 01/12/2021 03:25 AM        Oscar Wick M.D.   [facility]  Phone

## 2021-01-12 NOTE — PROGRESS NOTES
Bedside shift change report given to Marla Morrow RN (oncoming nurse) by Elly Ospina RN (offgoing nurse). Report included the following information SBAR, Intake/Output and Recent Results. Patient received, sleeping in bed.     0825 Patient requests pain medication, states \"everything on the right side\" hurts. 1324 Urinary catheter discontinued. Patient states that he feels the urge to pee now. Dr. Jam Pham at bedside now. Patient aware that we will bladder scan by 1530 if he has not urinated yet. Will make ID provider aware of patient's rash on upper back - noted by Dr. Jam Pham.    6943 Patient able to pee approx 50mL. 80 Dr. Adelina Carrera aware of patient's rash. Need to get PIV inserted so that fem line can be d/c. Spoke with charge nurse re: patient's right arm DVT and left arm scars from surgery. She will attempt to insert and follow up. 1112 Pain medication given. 1145 Patient resting in bed at this time. 1455 PIV inserted by Jose Morales. Patient has heparin and LR which cannot run together. Will address with night shift to see if we can get ultrasound guided PIV. 1737 Patient complains that he \"hurts all over\" and \"doesn't feel good\" but cannot elaborate. Patient appears to be slightly agitated, in clothing from home, states \"I am just ready to go home\" but not actively trying to leave. 1920 Bedside shift change report given to [facility], RN (oncoming nurse) by Marla Morrow RN (offgoing nurse). Report included the following information SBAR, Kardex and Recent Results.

## 2021-01-12 NOTE — PROGRESS NOTES
Hospitalist Progress Note             Date of Service:  2021  NAME:  Nicolle Negrete  :  1965  MRN:  [id number]    Assessment & Plan:      Septic shock/septicemia- due to PICC line  - yeast growing in bld cult - started on anidulafungin  - cont brd spec abx with vanco, cefepime  - wbc improved greatly from 29 to 12  - ct scan leg w/o contrast reviewed, no acute osteo seen, no abcess  - sp levophed  - lactic acid has improved   - dc central line  - remove kline  - discussed with ID     DM2  - cont lantus, lispro, ins ss     Psych- schizoaffective  - cont prozac, seroquel     Acute Kidney Injury  - cont ivf, trend, improving from 3.2 down to  1.2     Anemia  - suspect due to acute illness and infection  - hgb at 7.5, normocytic  - transfuse < 7       Code status: full  DVT prophylaxis:         Hospital Problems  Never Reviewed          Codes Class Noted POA    PICC line infection ICD-10-CM: T80.219A  ICD-9-CM: 999.31  2021 Unknown        * (Principal) Septic shock ([facility] ICD-10-CM: A41.9, R65.21  ICD-9-CM: 038.9, 785.52, 995.92  2021 Unknown        Type 2 diabetes mellitus (HCC) ICD-10-CM: E11.9  ICD-9-CM: 250.00  Unknown Unknown        Schizoaffective disorder, bipolar type ([facility] [address] ICD-10-CM: F25.0  ICD-9-CM: 295.70  Unknown Unknown        Hyperlipidemia ICD-10-CM: E78.5  ICD-9-CM: 272.4  Unknown Unknown        Infected hardware in right leg ([facility] [address] ICD-10-CM: T84. 7XXA  ICD-9-CM: 996.67  Unknown Unknown        Lactic acidosis ICD-10-CM: E87.2  ICD-9-CM: 276.2  Unknown Unknown        Acute renal failure (ARF) (HCC) ICD-10-CM: N17.9  ICD-9-CM: 584.9  2021 Unknown        Hypomagnesemia ICD-10-CM: M39.64  ICD-9-CM: 275.2  2021 Unknown                Review of Systems:   A comprehensive review of systems was negative except for that written in the HPI.        Vital Signs:    Last 24hrs VS reviewed since prior progress note. Most recent are:  Visit Vitals  BP (!) 129/90   Pulse (!) 110   Temp 97.8 °F (36.6 °C)   Resp 23   Ht 5' 7\" (1.702 m)   Wt 94.3 kg (208 lb)   SpO2 97%   BMI 32.58 kg/m²         Intake/Output Summary (Last 24 hours) at 1/12/2021 1307  Last data filed at 1/12/2021 1135  Gross per 24 hour   Intake 2509.55 ml   Output 6450 ml   Net -3940.45 ml        Physical Examination:        General:          Alert, cooperative, no distress, appears stated age.     HEENT:           Atraumatic, anicteric sclerae, pink conjunctivae                          No oral ulcers, mucosa moist, throat clear, dentition fair  Neck:               Supple, symmetrical  Lungs:             Clear to auscultation bilaterally.  No Wheezing or Rhonchi. No rales. Chest wall:      No tenderness  No Accessory muscle use. Heart:              Regular  rhythm,  No  murmur   No edema  Abdomen:        Soft, non-tender. Not distended.  Bowel sounds normal  Extremities:     R le sp surgical wounds, healed, tender  Skin:                Not pale.  Not Jaundiced  No rashes   Psych:             Not anxious or agitated.   Neurologic:      Alert, moves all extremities, answers questions appropriately and responds to commands               Data Review:    Review and/or order of clinical lab test  Review and/or order of tests in the radiology section of CPT  Review and/or order of tests in the medicine section of CPT      Labs:     Recent Labs     01/12/21  0325 01/11/21  1700 01/11/21  0940   WBC 12.6  --  15.7*   HGB 7.5* 7.7* 7.4*   HCT 24.7* 25.4* 23.9*     --  211     Recent Labs     01/12/21  0325 01/11/21  0322 01/10/21  0411    143 140   K 3.7 3.7 5.1   * 114* 109   CO2 22 19* 20*   BUN 18 32* 27*   CREA 1.15 2.03* 3.29*   GLU 85 151* 235*   CA 7.1* 6.5* 7.0*   MG 1.9  --   --    PHOS 3.1  --   --      Recent Labs     01/12/21  0325 01/11/21  0955   ALT  --  66*   AP  --  288*   TBILI  --  0.3   TP  --  5.8*   ALB 1.7* 1.7* GLOB  --  4.1*     Recent Labs     01/12/21  0127 01/11/21  1700 01/11/21  0940   INR  --   --  1.6*   PTP  --   --  18.7*   APTT 88.4* 58.2* 60.5*      No results for input(s): FE, TIBC, PSAT, FERR in the last 72 hours. No results found for: FOL, RBCF   No results for input(s): PH, PCO2, PO2 in the last 72 hours. No results for input(s): CPK, CKNDX, TROIQ in the last 72 hours.     No lab exists for component: CPKMB  No results found for: CHOL, CHOLX, CHLST, CHOLV, HDL, HDLP, LDL, LDLC, DLDLP, TGLX, TRIGL, TRIGP, CHHD, CHHDX  Lab Results   Component Value Date/Time    Glucose (POC) 122 (H) 01/12/2021 11:38 AM    Glucose (POC) 96 01/12/2021 08:05 AM    Glucose (POC) 124 (H) 01/11/2021 09:20 PM    Glucose (POC) 107 01/11/2021 05:01 PM    Glucose (POC) 148 (H) 01/11/2021 11:07 AM     Lab Results   Component Value Date/Time    Color DARK YELLOW 01/10/2021 12:40 PM    Appearance CLOUDY 01/10/2021 12:40 PM    Specific gravity 1.018 01/10/2021 12:40 PM    pH (UA) 5.0 01/10/2021 12:40 PM    Protein 30 (A) 01/10/2021 12:40 PM    Glucose Negative 01/10/2021 12:40 PM    Ketone TRACE (A) 01/10/2021 12:40 PM    Bilirubin Negative 01/10/2021 12:40 PM    Urobilinogen 0.2 01/10/2021 12:40 PM    Nitrites Negative 01/10/2021 12:40 PM    Leukocyte Esterase Negative 01/10/2021 12:40 PM    Bacteria 1+ (A) 01/10/2021 12:40 PM    WBC 0 to 3 01/10/2021 12:40 PM    RBC 0 to 1 01/10/2021 12:40 PM         Medications Reviewed:     Current Facility-Administered Medications   Medication Dose Route Frequency    vancomycin (VANCOCIN) 1500 mg in  ml infusion  1,500 mg IntraVENous Q24H    VANCOMYCIN INFORMATION NOTE   Other ONCE    lactated Ringers infusion  25 mL/hr IntraVENous CONTINUOUS    insulin glargine (LANTUS) injection 22 Units  22 Units SubCUTAneous QHS    anidulafungin (ERAXIS) 100 mg in 0.9% sodium chloride 130 mL IVPB  100 mg IntraVENous Q24H    melatonin tablet 6 mg  6 mg Oral QHS PRN    vasopressin (VASOSTRICT) 20 Units in 0.9% sodium chloride 100 mL infusion  0-0.03 Units/min IntraVENous TITRATE    oxyCODONE-acetaminophen (PERCOCET) 5-325 mg per tablet 1 Tab  1 Tab Oral Q6H PRN    VANCOMYCIN INFORMATION NOTE   Other Rx Dosing/Monitoring    nicotine (NICODERM CQ) 14 mg/24 hr patch 1 Patch  1 Patch TransDERmal DAILY    heparin 25,000 units in D5W 250 ml infusion  18-36 Units/kg/hr IntraVENous TITRATE    cefepime (MAXIPIME) 2 g in 0.9% sodium chloride (MBP/ADV) 100 mL MBP  2 g IntraVENous Q12H    NOREPINephrine (LEVOPHED) 8 mg in 0.9% NS 250ml infusion  0.5-16 mcg/min IntraVENous TITRATE    FLUoxetine (PROzac) capsule 80 mg  80 mg Oral DAILY    QUEtiapine (SEROquel) tablet 400 mg  400 mg Oral Q12H    sodium chloride (NS) flush 5-40 mL  5-40 mL IntraVENous Q8H    sodium chloride (NS) flush 5-40 mL  5-40 mL IntraVENous PRN    acetaminophen (TYLENOL) tablet 650 mg  650 mg Oral Q6H PRN    Or    acetaminophen (TYLENOL) suppository 650 mg  650 mg Rectal Q6H PRN    polyethylene glycol (MIRALAX) packet 17 g  17 g Oral DAILY PRN    promethazine (PHENERGAN) tablet 12.5 mg  12.5 mg Oral Q6H PRN    Or    ondansetron (ZOFRAN) injection 4 mg  4 mg IntraVENous Q6H PRN    pantoprazole (PROTONIX) 40 mg in 0.9% sodium chloride 10 mL injection  40 mg IntraVENous DAILY    insulin lispro (HUMALOG) injection   SubCUTAneous AC&HS    glucose chewable tablet 16 g  4 Tab Oral PRN    glucagon (GLUCAGEN) injection 1 mg  1 mg IntraMUSCular PRN    dextrose (D50) infusion 12.5-25 g  25-50 mL IntraVENous PRN     ______________________________________________________________________  EXPECTED LENGTH OF STAY: 6d 18h  ACTUAL LENGTH OF STAY:          3                 Mario Lozano MD
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
## 2021-01-12 NOTE — PROGRESS NOTES
Assumed care from Efrain yang [facility], [address] [place] [address] Patient is awake and alert, no distress noted. 0000> Patient remains off of pressors, VSS. Will continue to monitor. 0400> VSS.    0745> Bedside and Verbal shift change report given to DAYANA Graves (oncoming nurse) by Traci Anthony RN (offgoing nurse). Report included the following information SBAR, Kardex, Intake/Output, MAR, Recent Results and Cardiac Rhythm NSR, Sinus Tach.

## 2021-01-13 LAB
ALBUMIN SERPL-MCNC: 2 G/DL (ref 3.4–5)
ANION GAP SERPL CALC-SCNC: 9 MMOL/L (ref 3–18)
ANTIMICROBIAL SUSCEPTIBILITY, RWDC5T: ABNORMAL
APTT PPP: 70.5 SEC (ref 23–36.4)
BACTERIA SPEC AEROBE CULT: ABNORMAL
BACTERIA SPEC CULT: ABNORMAL
BASOPHILS # BLD: 0 K/UL (ref 0–0.1)
BASOPHILS NFR BLD: 0 % (ref 0–2)
BUN SERPL-MCNC: 13 MG/DL (ref 7–18)
BUN/CREAT SERPL: 13 (ref 12–20)
CALCIUM SERPL-MCNC: 7.8 MG/DL (ref 8.5–10.1)
CHLORIDE SERPL-SCNC: 111 MMOL/L (ref 100–111)
CO2 SERPL-SCNC: 25 MMOL/L (ref 21–32)
CREAT SERPL-MCNC: 0.98 MG/DL (ref 0.6–1.3)
DATE LAST DOSE: NORMAL
DIFFERENTIAL METHOD BLD: ABNORMAL
EOSINOPHIL # BLD: 0.4 K/UL (ref 0–0.4)
EOSINOPHIL NFR BLD: 3 % (ref 0–5)
ERYTHROCYTE [DISTWIDTH] IN BLOOD BY AUTOMATED COUNT: 15.3 % (ref 11.6–14.5)
GLUCOSE BLD STRIP.AUTO-MCNC: 105 MG/DL (ref 70–110)
GLUCOSE BLD STRIP.AUTO-MCNC: 76 MG/DL (ref 70–110)
GLUCOSE BLD STRIP.AUTO-MCNC: 88 MG/DL (ref 70–110)
GLUCOSE BLD STRIP.AUTO-MCNC: 95 MG/DL (ref 70–110)
GLUCOSE SERPL-MCNC: 80 MG/DL (ref 74–99)
GRAM STN SPEC: ABNORMAL
GRAM STN SPEC: ABNORMAL
HCT VFR BLD AUTO: 28.4 % (ref 36–48)
HGB BLD-MCNC: 8.7 G/DL (ref 13–16)
LYMPHOCYTES # BLD: 2.5 K/UL (ref 0.9–3.6)
LYMPHOCYTES NFR BLD: 23 % (ref 21–52)
Lab: ABNORMAL
Lab: ABNORMAL
MAGNESIUM SERPL-MCNC: 1.7 MG/DL (ref 1.6–2.6)
MCH RBC QN AUTO: 27.4 PG (ref 24–34)
MCHC RBC AUTO-ENTMCNC: 30.6 G/DL (ref 31–37)
MCV RBC AUTO: 89.6 FL (ref 74–97)
MONOCYTES # BLD: 0.5 K/UL (ref 0.05–1.2)
MONOCYTES NFR BLD: 5 % (ref 3–10)
NEUTS SEG # BLD: 7.4 K/UL (ref 1.8–8)
NEUTS SEG NFR BLD: 69 % (ref 40–73)
PHOSPHATE SERPL-MCNC: 3.3 MG/DL (ref 2.5–4.9)
PLATELET # BLD AUTO: 274 K/UL (ref 135–420)
PMV BLD AUTO: 10.8 FL (ref 9.2–11.8)
POTASSIUM SERPL-SCNC: 3.8 MMOL/L (ref 3.5–5.5)
RBC # BLD AUTO: 3.17 M/UL (ref 4.7–5.5)
REPORTED DOSE,DOSE: NORMAL UNITS
REPORTED DOSE/TIME,TMG: 0
RESULT 1, 997876: ABNORMAL
SERVICE CMNT-IMP: ABNORMAL
SODIUM SERPL-SCNC: 145 MMOL/L (ref 136–145)
SOURCE, RSRC96: ABNORMAL
SPECIMEN SOURCE: ABNORMAL
VANCOMYCIN TROUGH SERPL-MCNC: 14.3 UG/ML (ref 10–20)
WBC # BLD AUTO: 10.7 K/UL (ref 4.6–13.2)

## 2021-01-13 PROCEDURE — 74011250636 HC RX REV CODE- 250/636: Performed by: INTERNAL MEDICINE

## 2021-01-13 PROCEDURE — 87040 BLOOD CULTURE FOR BACTERIA: CPT

## 2021-01-13 PROCEDURE — 74011250637 HC RX REV CODE- 250/637: Performed by: INTERNAL MEDICINE

## 2021-01-13 PROCEDURE — 2709999900 HC NON-CHARGEABLE SUPPLY

## 2021-01-13 PROCEDURE — 74011250637 HC RX REV CODE- 250/637: Performed by: HOSPITALIST

## 2021-01-13 PROCEDURE — 85025 COMPLETE CBC W/AUTO DIFF WBC: CPT

## 2021-01-13 PROCEDURE — 82962 GLUCOSE BLOOD TEST: CPT

## 2021-01-13 PROCEDURE — 74011000258 HC RX REV CODE- 258: Performed by: INTERNAL MEDICINE

## 2021-01-13 PROCEDURE — C9113 INJ PANTOPRAZOLE SODIUM, VIA: HCPCS | Performed by: HOSPITALIST

## 2021-01-13 PROCEDURE — 80069 RENAL FUNCTION PANEL: CPT

## 2021-01-13 PROCEDURE — 85730 THROMBOPLASTIN TIME PARTIAL: CPT

## 2021-01-13 PROCEDURE — 74011000250 HC RX REV CODE- 250: Performed by: HOSPITALIST

## 2021-01-13 PROCEDURE — 65660000000 HC RM CCU STEPDOWN

## 2021-01-13 PROCEDURE — 74011636637 HC RX REV CODE- 636/637: Performed by: INTERNAL MEDICINE

## 2021-01-13 PROCEDURE — 83735 ASSAY OF MAGNESIUM: CPT

## 2021-01-13 PROCEDURE — 74011250636 HC RX REV CODE- 250/636: Performed by: HOSPITALIST

## 2021-01-13 PROCEDURE — 77030021352 HC CBL LD SYS DISP COVD -B

## 2021-01-13 RX ORDER — ENOXAPARIN SODIUM 100 MG/ML
90 INJECTION SUBCUTANEOUS EVERY 12 HOURS
Status: DISCONTINUED | OUTPATIENT
Start: 2021-01-13 | End: 2021-01-20 | Stop reason: HOSPADM

## 2021-01-13 RX ORDER — FAMOTIDINE 20 MG/1
20 TABLET, FILM COATED ORAL 2 TIMES DAILY
Status: DISCONTINUED | OUTPATIENT
Start: 2021-01-13 | End: 2021-01-20 | Stop reason: HOSPADM

## 2021-01-13 RX ORDER — HEPARIN SODIUM 1000 [USP'U]/ML
40 INJECTION, SOLUTION INTRAVENOUS; SUBCUTANEOUS ONCE
Status: COMPLETED | OUTPATIENT
Start: 2021-01-13 | End: 2021-01-13

## 2021-01-13 RX ADMIN — SODIUM CHLORIDE 10 ML: 9 INJECTION, SOLUTION INTRAMUSCULAR; INTRAVENOUS; SUBCUTANEOUS at 22:01

## 2021-01-13 RX ADMIN — INSULIN GLARGINE 22 UNITS: 100 INJECTION, SOLUTION SUBCUTANEOUS at 22:00

## 2021-01-13 RX ADMIN — OXYCODONE AND ACETAMINOPHEN 1 TABLET: 5; 325 TABLET ORAL at 20:40

## 2021-01-13 RX ADMIN — ENOXAPARIN SODIUM 90 MG: 100 INJECTION SUBCUTANEOUS at 14:53

## 2021-01-13 RX ADMIN — QUETIAPINE FUMARATE 400 MG: 200 TABLET ORAL at 08:48

## 2021-01-13 RX ADMIN — OXYCODONE AND ACETAMINOPHEN 1 TABLET: 5; 325 TABLET ORAL at 07:49

## 2021-01-13 RX ADMIN — FLUOXETINE 80 MG: 20 CAPSULE ORAL at 08:48

## 2021-01-13 RX ADMIN — CEFTRIAXONE 2 G: 2 INJECTION, POWDER, FOR SOLUTION INTRAMUSCULAR; INTRAVENOUS at 08:49

## 2021-01-13 RX ADMIN — SODIUM CHLORIDE 10 ML: 9 INJECTION, SOLUTION INTRAMUSCULAR; INTRAVENOUS; SUBCUTANEOUS at 07:47

## 2021-01-13 RX ADMIN — SODIUM CHLORIDE 100 MG: 900 INJECTION, SOLUTION INTRAVENOUS at 22:00

## 2021-01-13 RX ADMIN — OXYCODONE AND ACETAMINOPHEN 1 TABLET: 5; 325 TABLET ORAL at 14:51

## 2021-01-13 RX ADMIN — SODIUM CHLORIDE 40 MG: 9 INJECTION INTRAMUSCULAR; INTRAVENOUS; SUBCUTANEOUS at 08:48

## 2021-01-13 RX ADMIN — QUETIAPINE FUMARATE 400 MG: 200 TABLET ORAL at 22:00

## 2021-01-13 RX ADMIN — FAMOTIDINE 20 MG: 20 TABLET ORAL at 17:59

## 2021-01-13 RX ADMIN — HEPARIN SODIUM 3680 UNITS: 1000 INJECTION INTRAVENOUS; SUBCUTANEOUS at 07:47

## 2021-01-13 RX ADMIN — HEPARIN SODIUM AND DEXTROSE 21 UNITS/KG/HR: 10000; 5 INJECTION INTRAVENOUS at 07:57

## 2021-01-13 RX ADMIN — SODIUM CHLORIDE 10 ML: 9 INJECTION, SOLUTION INTRAMUSCULAR; INTRAVENOUS; SUBCUTANEOUS at 14:51
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
## 2021-01-13 NOTE — PROGRESS NOTES
Hospitalist Progress Note             Date of Service:  2021  NAME:  Juan Jose Thapa  :  1965  MRN:  [id number]    Assessment & Plan:      Septic shock/septicemia- due to PICC line  - yeast growing in bld cult - started on anidulafungin  - downgrade abx to ceftriaxone  - wbc improved greatly from 29 to 12 to 10.7  - ct scan leg w/o contrast reviewed, no acute osteo seen, no abcess  - sp levophed  - lactic acid has improved   - dc central line  - remove kline  - discussed with ID  - transfer out of icu    Picc related upper extremity DVT  - dc hep drip, start lovenox, likley dc on short course noac     DM2  - cont lantus, lispro, ins ss     Psych- schizoaffective  - cont prozac, seroquel     Acute Kidney Injury  - resolved, dc ivf     Anemia  - suspect due to acute illness and infection  - hgb at 8.7, normocytic  - transfuse < 7        Code status: full  DVT prophylaxis:         Hospital Problems  Never Reviewed          Codes Class Noted POA    PICC line infection ICD-10-CM: T80.219A  ICD-9-CM: 999.31  2021 Unknown        * (Principal) Septic shock ([facility] [address] ICD-10-CM: A41.9, R65.21  ICD-9-CM: 038.9, 785.52, 995.92  2021 Unknown        Type 2 diabetes mellitus (HCC) ICD-10-CM: E11.9  ICD-9-CM: 250.00  Unknown Unknown        Schizoaffective disorder, bipolar type ([facility] [address] ICD-10-CM: F25.0  ICD-9-CM: 295.70  Unknown Unknown        Hyperlipidemia ICD-10-CM: E78.5  ICD-9-CM: 272.4  Unknown Unknown        Infected hardware in right leg ([facility] [address] ICD-10-CM: T84. 7XXA  ICD-9-CM: 996.67  Unknown Unknown        Lactic acidosis ICD-10-CM: E87.2  ICD-9-CM: 276.2  Unknown Unknown        Acute renal failure (ARF) (HCC) ICD-10-CM: N17.9  ICD-9-CM: 584.9  2021 Unknown        Hypomagnesemia ICD-10-CM: X65.93  ICD-9-CM: 275.2  2021 Unknown                Review of Systems:   A comprehensive review of systems was negative except for that written in the HPI. Feels well, no new complaints, vitals and labs all improving      Vital Signs:    Last 24hrs VS reviewed since prior progress note. Most recent are:  Visit Vitals  /88   Pulse 81   Temp 98 °F (36.7 °C)   Resp 18   Ht 5' 7\" (1.702 m)   Wt 92.1 kg (203 lb)   SpO2 97%   BMI 31.79 kg/m²         Intake/Output Summary (Last 24 hours) at 1/13/2021 1233  Last data filed at 1/13/2021 1110  Gross per 24 hour   Intake 240 ml   Output 1525 ml   Net -1285 ml        Physical Examination:             General:          Alert, cooperative, no distress, appears stated age. HEENT:           Atraumatic, anicteric sclerae, pink conjunctivae                          No oral ulcers, mucosa moist, throat clear, dentition fair  Neck:               Supple, symmetrical  Lungs:             Clear to auscultation bilaterally. No Wheezing or Rhonchi. No rales. Chest wall:      No tenderness  No Accessory muscle use. Heart:              Regular  rhythm,  No  murmur   No edema  Abdomen:        Soft, non-tender. Not distended. Bowel sounds normal  Extremities:     cental line remains in L groin  Skin:                r le with recent surgical wound healing  Psych:             Not anxious or agitated.   Neurologic:      Alert, moves all extremities, answers questions appropriately and responds to commands        Data Review:    Review and/or order of clinical lab test  Review and/or order of tests in the radiology section of CPT  Review and/or order of tests in the medicine section of CPT      Labs:     Recent Labs     01/13/21 0440 01/12/21 0325   WBC 10.7 12.6   HGB 8.7* 7.5*   HCT 28.4* 24.7*    237     Recent Labs     01/13/21 0440 01/12/21  0325 01/11/21  0322    144 143   K 3.8 3.7 3.7    114* 114*   CO2 25 22 19*   BUN 13 18 32*   CREA 0.98 1.15 2.03*   GLU 80 85 151*   CA 7.8* 7.1* 6.5*   MG 1.7 1.9  --    PHOS 3.3 3.1  --      Recent Labs     01/13/21 0440 01/12/21 0325 01/11/21  0955   ALT  --   --  66*   AP  --   --  288*   TBILI  --   --  0.3   TP  --   --  5.8*   ALB 2.0* 1.7* 1.7*   GLOB  --   --  4.1*     Recent Labs     01/13/21  0440 01/12/21  0127 01/11/21  1700 01/11/21  0940   INR  --   --   --  1.6*   PTP  --   --   --  18.7*   APTT 70.5* 88.4* 58.2* 60.5*      No results for input(s): FE, TIBC, PSAT, FERR in the last 72 hours. No results found for: FOL, RBCF   No results for input(s): PH, PCO2, PO2 in the last 72 hours. No results for input(s): CPK, CKNDX, TROIQ in the last 72 hours.     No lab exists for component: CPKMB  No results found for: CHOL, CHOLX, CHLST, CHOLV, HDL, HDLP, LDL, LDLC, DLDLP, TGLX, TRIGL, TRIGP, CHHD, CHHDX  Lab Results   Component Value Date/Time    Glucose (POC) 95 01/13/2021 11:58 AM    Glucose (POC) 105 01/13/2021 07:59 AM    Glucose (POC) 105 01/12/2021 09:53 PM    Glucose (POC) 98 01/12/2021 04:06 PM    Glucose (POC) 122 (H) 01/12/2021 11:38 AM     Lab Results   Component Value Date/Time    Color DARK YELLOW 01/10/2021 12:40 PM    Appearance CLOUDY 01/10/2021 12:40 PM    Specific gravity 1.018 01/10/2021 12:40 PM    pH (UA) 5.0 01/10/2021 12:40 PM    Protein 30 (A) 01/10/2021 12:40 PM    Glucose Negative 01/10/2021 12:40 PM    Ketone TRACE (A) 01/10/2021 12:40 PM    Bilirubin Negative 01/10/2021 12:40 PM    Urobilinogen 0.2 01/10/2021 12:40 PM    Nitrites Negative 01/10/2021 12:40 PM    Leukocyte Esterase Negative 01/10/2021 12:40 PM    Bacteria 1+ (A) 01/10/2021 12:40 PM    WBC 0 to 3 01/10/2021 12:40 PM    RBC 0 to 1 01/10/2021 12:40 PM         Medications Reviewed:     Current Facility-Administered Medications   Medication Dose Route Frequency    cefTRIAXone (ROCEPHIN) 2 g in 0.9% sodium chloride (MBP/ADV) 50 mL MBP  2 g IntraVENous Q24H    enoxaparin (LOVENOX) injection 90 mg  90 mg SubCUTAneous Q12H    famotidine (PEPCID) tablet 20 mg  20 mg Oral BID    insulin glargine (LANTUS) injection 22 Units  22 Units SubCUTAneous QHS    anidulafungin (ERAXIS) 100 mg in 0.9% sodium chloride 130 mL IVPB  100 mg IntraVENous Q24H    melatonin tablet 6 mg  6 mg Oral QHS PRN    oxyCODONE-acetaminophen (PERCOCET) 5-325 mg per tablet 1 Tab  1 Tab Oral Q6H PRN    nicotine (NICODERM CQ) 14 mg/24 hr patch 1 Patch  1 Patch TransDERmal DAILY    FLUoxetine (PROzac) capsule 80 mg  80 mg Oral DAILY    QUEtiapine (SEROquel) tablet 400 mg  400 mg Oral Q12H    sodium chloride (NS) flush 5-40 mL  5-40 mL IntraVENous Q8H    sodium chloride (NS) flush 5-40 mL  5-40 mL IntraVENous PRN    acetaminophen (TYLENOL) tablet 650 mg  650 mg Oral Q6H PRN    Or    acetaminophen (TYLENOL) suppository 650 mg  650 mg Rectal Q6H PRN    polyethylene glycol (MIRALAX) packet 17 g  17 g Oral DAILY PRN    promethazine (PHENERGAN) tablet 12.5 mg  12.5 mg Oral Q6H PRN    Or    ondansetron (ZOFRAN) injection 4 mg  4 mg IntraVENous Q6H PRN    insulin lispro (HUMALOG) injection   SubCUTAneous AC&HS    glucose chewable tablet 16 g  4 Tab Oral PRN    glucagon (GLUCAGEN) injection 1 mg  1 mg IntraMUSCular PRN    dextrose (D50) infusion 12.5-25 g  25-50 mL IntraVENous PRN     ______________________________________________________________________  EXPECTED LENGTH OF STAY: 6d 18h  ACTUAL LENGTH OF STAY:          4                 Viv Prakash MD

## 2021-01-13 NOTE — ROUTINE PROCESS
Bedside and Verbal shift change report given to [facility] RN (oncoming nurse) by Salomón Resendiz RN (offgoing nurse). Report included the following information SBAR, Intake/Output, MAR, Recent Results and Cardiac Rhythm SR.  
 
2330 Percocet given at pt request. 
 
0115 Pt noted to have pulled off leads, removed shirt. Pt mildly disoriented, stated the leads had been off for \"hours. \" Equipment replaced, pt in bed, bed alarm set.

## 2021-01-13 NOTE — ROUTINE PROCESS
TRANSFER - OUT REPORT: 
 
Verbal report given to Stef Oliver RN on [employer]  being transferred to [facility] (unit) for routine progression of care Report consisted of patients Situation, Background, Assessment and  
Recommendations(SBAR). Information from the following report(s) SBAR, Procedure Summary, Intake/Output, MAR, Recent Results and Cardiac Rhythm NSR was reviewed with the receiving nurse. Lines:  
Peripheral IV 01/12/21 Anterior;Right Antecubital (Active) Site Assessment Clean, dry, & intact 01/12/21 2000 Phlebitis Assessment 0 01/12/21 2000 Infiltration Assessment 0 01/12/21 2000 Dressing Status Clean, dry, & intact 01/12/21 2000 Dressing Type Tape;Transparent 01/12/21 2000 Hub Color/Line Status Pink;Flushed;Capped 01/12/21 2000 Action Taken Open ports on tubing capped 01/12/21 2000 Alcohol Cap Used Yes 01/12/21 2000 Opportunity for questions and clarification was provided. Patient transported with: 
 Registered Nurse

## 2021-01-13 NOTE — PROGRESS NOTES
RENAL PROGRESS NOTE        Vicente Gan         Assessment/Plan:   · GENE. Etio- volume depletion/septic shock in a setting of catheter related yeast blood stream infection. Renal function is normalizing, good uo. D/c ivf given improving oral intake. · Catheter related yeast bsi with septic shock. On abx per ID. Off pressors. · Acute lt arm catheter related dvt. On heparin drip. · Malnutrition. · Will s/o, please call if any questions. Subjective:  Patient complaints off: Feels better. No SOB/CP/N/V. Tolerates diet. Patient Active Problem List   Diagnosis Code    PICC line infection T80.219A    Septic shock ([address] A41.9, R65.21    Type 2 diabetes mellitus ([address] E11.9    Schizoaffective disorder, bipolar type ([address] F25.0    Hyperlipidemia E78.5    Infected hardware in right leg ([address] T84. 7XXA    Lactic acidosis E87.2    Acute renal failure (ARF) (HCC) N17.9    Hypomagnesemia E83.42       Current Facility-Administered Medications   Medication Dose Route Frequency Provider Last Rate Last Admin    cefTRIAXone (ROCEPHIN) 2 g in 0.9% sodium chloride (MBP/ADV) 50 mL MBP  2 g IntraVENous Q24H Leobardo Helms  mL/hr at 01/13/21 0849 2 g at 01/13/21 0849    lactated Ringers infusion  25 mL/hr IntraVENous CONTINUOUS Suleman Verma MD 25 mL/hr at 01/12/21 2343 25 mL/hr at 01/12/21 2343    insulin glargine (LANTUS) injection 22 Units  22 Units SubCUTAneous QHS Suleman Verma MD   22 Units at 01/12/21 2337    anidulafungin (ERAXIS) 100 mg in 0.9% sodium chloride 130 mL IVPB  100 mg IntraVENous Q24H Leobardo Helms MD   100 mg at 01/12/21 2138    melatonin tablet 6 mg  6 mg Oral QHS PRN Pegge Nyhan, MD   6 mg at 01/11/21 2125    oxyCODONE-acetaminophen (PERCOCET) 5-325 mg per tablet 1 Tab  1 Tab Oral Q6H PRN Luigi Stewart MD   1 Tab at 01/13/21 5836  nicotine (NICODERM CQ) 14 mg/24 hr patch 1 Patch  1 Patch TransDERmal DAILY Beather Atkinson H, DO   1 Patch at 01/12/21 1618    heparin 25,000 units in D5W 250 ml infusion  18-36 Units/kg/hr IntraVENous TITRATE Marie Salinas MD 18.9 mL/hr at 01/13/21 0757 21 Units/kg/hr at 01/13/21 0757    FLUoxetine (PROzac) capsule 80 mg  80 mg Oral DAILY Ky Plate, DO   80 mg at 01/13/21 0848    QUEtiapine (SEROquel) tablet 400 mg  400 mg Oral Q12H Beather Luna H, DO   400 mg at 01/13/21 0848    sodium chloride (NS) flush 5-40 mL  5-40 mL IntraVENous Q8H Beather Luna H, DO   10 mL at 01/13/21 0747    sodium chloride (NS) flush 5-40 mL  5-40 mL IntraVENous PRN Ky Plate, DO        acetaminophen (TYLENOL) tablet 650 mg  650 mg Oral Q6H PRN Ky Plate, DO   650 mg at 01/11/21 2130    Or    acetaminophen (TYLENOL) suppository 650 mg  650 mg Rectal Q6H PRN Ky Plate, DO        polyethylene glycol (MIRALAX) packet 17 g  17 g Oral DAILY PRN Ky Plate, DO        promethazine (PHENERGAN) tablet 12.5 mg  12.5 mg Oral Q6H PRN [facility], DO        Or    ondansetron [facility]) injection 4 mg  4 mg IntraVENous Q6H PRN Ky Plate, DO        pantoprazole (PROTONIX) 40 mg in 0.9% sodium chloride 10 mL injection  40 mg IntraVENous DAILY Beather Atkinson H, DO   40 mg at 01/13/21 0848    insulin lispro (HUMALOG) injection   SubCUTAneous AC&HS Ky Plate, DO   Stopped at 01/11/21 1130    glucose chewable tablet 16 g  4 Tab Oral PRN Ky Plate, DO        glucagon (GLUCAGEN) injection 1 mg  1 mg IntraMUSCular PRN Ky Plate, DO        dextrose (D50) infusion 12.5-25 g  25-50 mL IntraVENous PRN Beather Luna H, DO           Objective  Vitals:    01/13/21 0500 01/13/21 0600 01/13/21 0710 01/13/21 0800   BP: (!) 135/90 (!) 127/35     Pulse: 92 88     Resp: 14 20     Temp:    98.4 °F (36.9 °C)   SpO2: 98% 94%     Weight: 92.1 kg (203 lb)    Height:             Intake/Output Summary (Last 24 hours) at 1/13/2021 0955  Last data filed at 1/13/2021 6576  Gross per 24 hour   Intake 240 ml   Output 2875 ml   Net -2635 ml           Admission weight: Weight: 81.6 kg (180 lb) (01/09/21 1030)  Last Weight Metrics:  Weight Loss Metrics 1/13/2021 1/1/2021   Today's Wt 203 lb 198 lb   BMI 31.79 kg/m2 31.01 kg/m2             Physical Assessment:     General: NAD, alert and oriented. Neck: No jvd. LUNGS: Clear to Auscultation, No rales, rhonchi or wheezes. CVS EXM: S1, S2  RRR, no murmurs/gallops/rubs. Abdomen: soft, non tender. Lower Extremities:  trace edema. Lab    CBC w/Diff Recent Labs     01/13/21  0440 01/12/21  0325 01/11/21  1700 01/11/21  0940   WBC 10.7 12.6  --  15.7*   RBC 3.17* 2.75*  --  2.66*   HGB 8.7* 7.5* 7.7* 7.4*   HCT 28.4* 24.7* 25.4* 23.9*    237  --  211   GRANS 69 77*  --  79*   LYMPH 23 18*  --  17*   EOS 3 3  --  1        Chemistry Recent Labs     01/13/21  0440 01/12/21  0325 01/11/21  0955 01/11/21  0955 01/11/21  0322   GLU 80 85  --   --  151*    144  --   --  143   K 3.8 3.7  --   --  3.7    114*  --   --  114*   CO2 25 22  --   --  19*   BUN 13 18  --   --  32*   CREA 0.98 1.15  --   --  2.03*   CA 7.8* 7.1*  --   --  6.5*   AGAP 9 8  --   --  10   BUCR 13 16  --   --  16   AP  --   --   --  288*  --    TP  --   --   --  5.8*  --    ALB 2.0* 1.7*  --  1.7*  --    GLOB  --   --   --  4.1*  --    AGRAT  --   --   --  0.4*  --    PHOS 3.3 3.1   < >  --   --     < > = values in this interval not displayed. No results found for: IRON, FE, TIBC, IBCT, PSAT, FERR   Lab Results   Component Value Date/Time    Calcium 7.8 (L) 01/13/2021 04:40 AM    Phosphorus 3.3 01/13/2021 04:40 AM        Say Shaffer M.D.   [facility]  Phone

## 2021-01-13 NOTE — PROGRESS NOTES
Patient received in bed awake. Patient A&O x4, denies pain and discomfort. No distress noted. Frequently use items within reach. Bed locked in low position. Call bell within reach and Patient verbalized understanding of use for assistance and needs. 2484- Patient given Percocet 5- 325 mg 1 tab PO for right side pain 8/10. See MAR and pain assessments. 0- Dr. Ruth Ann Abdullahi to bedside with this nurse made aware that Patient's femoral line remains in place for IVF and IV med's, also has and right arm peripheral IV. Dr. Ruth Ann Abdullahi said that he would review med listings and make changes. 1300- Patient awake. Left groin triple lumen was removed as ordered; psi dsg applied with no active bleeding. Tolerated well. Call bell w/in reach. 1335- Patient standing in room. Removed BP monitoring; leads, pulse ox and left groin dsg with no active bleeding. Call bell w/in reach. 1451 Patient given Percocet 5- 325 mg 1 tab PO for right side pain 8/10. See MAR and pain assessments. 1607- Patient sitting up. Refusing to be reconnected to BP monitoring; leads, and pulse ox said he want to \"wait till the doctor comes. Call bell w/in reach. 200- Patient transferred from ICU 2603 to [address] [place] [address] 2215 via w/c, accompanied by this nurse. No distress noted. Has belonging. Dual skin assessment performed with this nurse and Makenzie Gann RN; noted large scab area to right knee s/p hardware removal prior to admit.

## 2021-01-14 LAB
ALBUMIN SERPL-MCNC: 2.1 G/DL (ref 3.4–5)
ANION GAP SERPL CALC-SCNC: 7 MMOL/L (ref 3–18)
APTT PPP: 64.7 SEC (ref 23–36.4)
BASOPHILS # BLD: 0 K/UL (ref 0–0.1)
BASOPHILS NFR BLD: 0 % (ref 0–3)
BUN SERPL-MCNC: 13 MG/DL (ref 7–18)
BUN/CREAT SERPL: 16 (ref 12–20)
CALCIUM SERPL-MCNC: 8.1 MG/DL (ref 8.5–10.1)
CHLORIDE SERPL-SCNC: 111 MMOL/L (ref 100–111)
CO2 SERPL-SCNC: 24 MMOL/L (ref 21–32)
CREAT SERPL-MCNC: 0.83 MG/DL (ref 0.6–1.3)
DIFFERENTIAL METHOD BLD: ABNORMAL
EOSINOPHIL # BLD: 0.5 K/UL (ref 0–0.4)
EOSINOPHIL NFR BLD: 5 % (ref 0–5)
ERYTHROCYTE [DISTWIDTH] IN BLOOD BY AUTOMATED COUNT: 15.4 % (ref 11.6–14.5)
GLUCOSE BLD STRIP.AUTO-MCNC: 123 MG/DL (ref 70–110)
GLUCOSE BLD STRIP.AUTO-MCNC: 126 MG/DL (ref 70–110)
GLUCOSE BLD STRIP.AUTO-MCNC: 65 MG/DL (ref 70–110)
GLUCOSE BLD STRIP.AUTO-MCNC: 84 MG/DL (ref 70–110)
GLUCOSE BLD STRIP.AUTO-MCNC: 86 MG/DL (ref 70–110)
GLUCOSE BLD STRIP.AUTO-MCNC: 95 MG/DL (ref 70–110)
GLUCOSE SERPL-MCNC: 54 MG/DL (ref 74–99)
HCT VFR BLD AUTO: 27.2 % (ref 36–48)
HGB BLD-MCNC: 8.4 G/DL (ref 13–16)
LYMPHOCYTES # BLD: 1.1 K/UL (ref 0.8–3.5)
LYMPHOCYTES NFR BLD: 10 % (ref 20–51)
MAGNESIUM SERPL-MCNC: 1.8 MG/DL (ref 1.6–2.6)
MCH RBC QN AUTO: 27.7 PG (ref 24–34)
MCHC RBC AUTO-ENTMCNC: 30.9 G/DL (ref 31–37)
MCV RBC AUTO: 89.8 FL (ref 74–97)
MONOCYTES # BLD: 1.1 K/UL (ref 0–1)
MONOCYTES NFR BLD: 10 % (ref 2–9)
NEUTS BAND NFR BLD MANUAL: 3 % (ref 0–5)
NEUTS SEG # BLD: 7.8 K/UL (ref 1.8–8)
NEUTS SEG NFR BLD: 72 % (ref 42–75)
PHOSPHATE SERPL-MCNC: 3.9 MG/DL (ref 2.5–4.9)
PLATELET # BLD AUTO: 285 K/UL (ref 135–420)
PLATELET COMMENTS,PCOM: ABNORMAL
PMV BLD AUTO: 10.6 FL (ref 9.2–11.8)
POTASSIUM SERPL-SCNC: 3.9 MMOL/L (ref 3.5–5.5)
RBC # BLD AUTO: 3.03 M/UL (ref 4.7–5.5)
RBC MORPH BLD: ABNORMAL
RBC MORPH BLD: ABNORMAL
SODIUM SERPL-SCNC: 142 MMOL/L (ref 136–145)
WBC # BLD AUTO: 10.8 K/UL (ref 4.6–13.2)

## 2021-01-14 PROCEDURE — 2709999900 HC NON-CHARGEABLE SUPPLY

## 2021-01-14 PROCEDURE — 83735 ASSAY OF MAGNESIUM: CPT

## 2021-01-14 PROCEDURE — 85730 THROMBOPLASTIN TIME PARTIAL: CPT

## 2021-01-14 PROCEDURE — 74011250636 HC RX REV CODE- 250/636: Performed by: INTERNAL MEDICINE

## 2021-01-14 PROCEDURE — 74011000258 HC RX REV CODE- 258: Performed by: INTERNAL MEDICINE

## 2021-01-14 PROCEDURE — 65660000000 HC RM CCU STEPDOWN

## 2021-01-14 PROCEDURE — 74011636637 HC RX REV CODE- 636/637: Performed by: INTERNAL MEDICINE

## 2021-01-14 PROCEDURE — 74011250637 HC RX REV CODE- 250/637: Performed by: INTERNAL MEDICINE

## 2021-01-14 PROCEDURE — 82962 GLUCOSE BLOOD TEST: CPT

## 2021-01-14 PROCEDURE — 80069 RENAL FUNCTION PANEL: CPT

## 2021-01-14 PROCEDURE — 74011250637 HC RX REV CODE- 250/637: Performed by: HOSPITALIST

## 2021-01-14 PROCEDURE — 85025 COMPLETE CBC W/AUTO DIFF WBC: CPT

## 2021-01-14 RX ORDER — FLUCONAZOLE 2 MG/ML
400 INJECTION, SOLUTION INTRAVENOUS EVERY 24 HOURS
Status: DISCONTINUED | OUTPATIENT
Start: 2021-01-14 | End: 2021-01-14

## 2021-01-14 RX ORDER — QUETIAPINE FUMARATE 200 MG/1
400 TABLET, FILM COATED ORAL EVERY 12 HOURS
Status: DISCONTINUED | OUTPATIENT
Start: 2021-01-14 | End: 2021-01-20 | Stop reason: HOSPADM

## 2021-01-14 RX ORDER — INSULIN GLARGINE 100 [IU]/ML
18 INJECTION, SOLUTION SUBCUTANEOUS
Status: DISCONTINUED | OUTPATIENT
Start: 2021-01-14 | End: 2021-01-16

## 2021-01-14 RX ORDER — MAG HYDROX/ALUMINUM HYD/SIMETH 200-200-20
30 SUSPENSION, ORAL (FINAL DOSE FORM) ORAL
Status: DISCONTINUED | OUTPATIENT
Start: 2021-01-14 | End: 2021-01-20 | Stop reason: HOSPADM

## 2021-01-14 RX ORDER — CALCIUM CARBONATE 200(500)MG
400 TABLET,CHEWABLE ORAL
Status: DISCONTINUED | OUTPATIENT
Start: 2021-01-14 | End: 2021-01-20 | Stop reason: HOSPADM

## 2021-01-14 RX ADMIN — SODIUM CHLORIDE 100 MG: 9 INJECTION, SOLUTION INTRAVENOUS at 16:48

## 2021-01-14 RX ADMIN — ENOXAPARIN SODIUM 90 MG: 100 INJECTION SUBCUTANEOUS at 01:06

## 2021-01-14 RX ADMIN — OXYCODONE AND ACETAMINOPHEN 1 TABLET: 5; 325 TABLET ORAL at 09:10

## 2021-01-14 RX ADMIN — SODIUM CHLORIDE 5 ML: 9 INJECTION, SOLUTION INTRAMUSCULAR; INTRAVENOUS; SUBCUTANEOUS at 14:00

## 2021-01-14 RX ADMIN — QUETIAPINE FUMARATE 400 MG: 200 TABLET ORAL at 21:05

## 2021-01-14 RX ADMIN — OXYCODONE AND ACETAMINOPHEN 1 TABLET: 5; 325 TABLET ORAL at 21:05

## 2021-01-14 RX ADMIN — OXYCODONE AND ACETAMINOPHEN 1 TABLET: 5; 325 TABLET ORAL at 15:12

## 2021-01-14 RX ADMIN — OXYCODONE AND ACETAMINOPHEN 1 TABLET: 5; 325 TABLET ORAL at 02:52

## 2021-01-14 RX ADMIN — QUETIAPINE FUMARATE 400 MG: 200 TABLET ORAL at 09:07

## 2021-01-14 RX ADMIN — FLUOXETINE 80 MG: 20 CAPSULE ORAL at 09:08

## 2021-01-14 RX ADMIN — INSULIN GLARGINE 18 UNITS: 100 INJECTION, SOLUTION SUBCUTANEOUS at 22:09

## 2021-01-14 RX ADMIN — CEFTRIAXONE 2 G: 2 INJECTION, POWDER, FOR SOLUTION INTRAMUSCULAR; INTRAVENOUS at 09:08

## 2021-01-14 RX ADMIN — SODIUM CHLORIDE 10 ML: 9 INJECTION, SOLUTION INTRAMUSCULAR; INTRAVENOUS; SUBCUTANEOUS at 05:13

## 2021-01-14 RX ADMIN — FAMOTIDINE 20 MG: 20 TABLET ORAL at 09:08

## 2021-01-14 RX ADMIN — ENOXAPARIN SODIUM 90 MG: 100 INJECTION SUBCUTANEOUS at 15:13

## 2021-01-14 RX ADMIN — FAMOTIDINE 20 MG: 20 TABLET ORAL at 17:53

## 2021-01-14 RX ADMIN — SODIUM CHLORIDE 10 ML: 9 INJECTION, SOLUTION INTRAMUSCULAR; INTRAVENOUS; SUBCUTANEOUS at 21:06
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
## 2021-01-14 NOTE — PROGRESS NOTES
conducted a Follow up consultation and Spiritual Assessment for Zeferino Ace, who is a 54 y. o.,male. The  provided the following Interventions:  Continued the relationship of care and support. Listened empathically. Offered prayer and assurance of continued prayer on patients behalf. Chart reviewed. The following outcomes were achieved:  Patient expressed gratitude for pastoral care visit. Assessment:  There are no further spiritual or [place]ian issues which require Spiritual Care Services interventions at this time. Plan:  Chaplains will continue to follow and will provide pastoral care on an as needed/requested basis.  recommends bedside caregivers page  on duty if patient shows signs of acute spiritual or emotional distress. Patient feeling better. Resting and hopeful for full recovery. Positive attitude.      [address] [facility]   Staff [facility]   [phone]

## 2021-01-14 NOTE — PROGRESS NOTES
Hospitalist Progress Note             Date of Service:  2021  NAME:  Zeferino Ace  :  1965  MRN:  [id number]    Assessment & Plan:      Septic shock/septicemia- due to PICC line  - yeast growing in bld cult - started on anidulafungin  - downgrade abx to ceftriaxone  - wbc improved wo wnl  - ct scan leg w/o contrast reviewed, no acute osteo seen, no abcess  - sp levophed  - lactic acid has improved   - sp central line  - sp kline  - reviewed id not, awaiting full yeast profile= CANDIDA DUBLINIENSIS GROWING IN THE AEROBIC BOTTLE, abx complete today, will need iv antifungal, may need another midline/piccline, opposite arm    Picc related upper extremity DVT  - cont lovenox, likley dc on short course noac, especially since may have to dc with another picc     DM2  - cont lantus, lispro, ins ss  - hypoglycemia this am, decrease lantus to 18     Psych- schizoaffective  - cont prozac, seroquel     Acute Kidney Injury  - resolved, dc ivf     Anemia  - suspect due to acute illness and infection  - hgb at 8.4, normocytic  - transfuse < 7        Code status: full  DVT prophylaxis:         Hospital Problems  Never Reviewed          Codes Class Noted POA    PICC line infection ICD-10-CM: T80.219A  ICD-9-CM: 999.31  2021 Unknown        * (Principal) Septic [facility] ICD-10-CM: A41.9, R65.21  ICD-9-CM: 038.9, 785.52, 995.92  2021 Unknown        Type 2 diabetes mellitus (HCC) ICD-10-CM: E11.9  ICD-9-CM: 250.00  Unknown Unknown        Schizoaffective disorder, bipolar type ([facility] ICD-10-CM: F25.0  ICD-9-CM: 295.70  Unknown Unknown        Hyperlipidemia ICD-10-CM: E78.5  ICD-9-CM: 272.4  Unknown Unknown        Infected hardware in right leg ([facility] ICD-10-CM: T84. 7XXA  ICD-9-CM: 996.67  Unknown Unknown        Lactic acidosis ICD-10-CM: E87.2  ICD-9-CM: 276.2  Unknown Unknown        Acute renal failure (ARF) ([facility] ICD-10-CM: N17.9  ICD-9-CM: 584.9  1/9/2021 Unknown        Hypomagnesemia ICD-10-CM: V98.98  ICD-9-CM: 275.2  1/9/2021 Unknown                Review of Systems:   A comprehensive review of systems was negative except for that written in the HPI. Pt having hiccups with heart burn,no other complaints      Vital Signs:    Last 24hrs VS reviewed since prior progress note. Most recent are:  Visit Vitals  BP (!) 143/92 (BP 1 Location: Right arm, BP Patient Position: At rest)   Pulse 84   Temp 98 °F (36.7 °C)   Resp 18   Ht 5' 7\" (1.702 m)   Wt 92.1 kg (203 lb)   SpO2 95%   BMI 31.79 kg/m²         Intake/Output Summary (Last 24 hours) at 1/14/2021 1048  Last data filed at 1/14/2021 0106  Gross per 24 hour   Intake 600 ml   Output 250 ml   Net 350 ml        Physical Examination:       General:          Alert, cooperative, no distress, appears stated age. Caralee Tenriism  HEENT:           Atraumatic, anicteric sclerae, pink conjunctivae                          No oral ulcers, mucosa moist, throat clear, dentition fair  Neck:               Supple, symmetrical  Lungs:             Clear to auscultation bilaterally.  No Wheezing or Rhonchi. No rales. Chest wall:      No tenderness  No Accessory muscle use. Heart:              Regular  rhythm,  No  murmur   No edema  Abdomen:        Soft, non-tender. Not distended.  Bowel sounds normal  Extremities:     cental line remains in L groin  Skin:                r le with recent surgical wound healing  Psych:             Not anxious or agitated.   Neurologic:      Alert, moves all extremities, answers questions appropriately and responds to commands               Data Review:    Review and/or order of clinical lab test  Review and/or order of tests in the radiology section of CPT  Review and/or order of tests in the medicine section of CPT      Labs:     Recent Labs     01/14/21 0355 01/13/21  0440   WBC 10.8 10.7   HGB 8.4* 8.7*   HCT 27.2* 28.4*    274     Recent Labs     01/14/21 0355 01/13/21  0440 01/12/21  0325    145 144   K 3.9 3.8 3.7    111 114*   CO2 24 25 22   BUN 13 13 18   CREA 0.83 0.98 1.15   GLU 54* 80 85   CA 8.1* 7.8* 7.1*   MG 1.8 1.7 1.9   PHOS 3.9 3.3 3.1     Recent Labs     01/14/21  0355 01/13/21  0440 01/12/21  0325   ALB 2.1* 2.0* 1.7*     Recent Labs     01/14/21  0355 01/13/21 0440 01/12/21  0127   APTT 64.7* 70.5* 88.4*      No results for input(s): FE, TIBC, PSAT, FERR in the last 72 hours. No results found for: FOL, RBCF   No results for input(s): PH, PCO2, PO2 in the last 72 hours. No results for input(s): CPK, CKNDX, TROIQ in the last 72 hours.     No lab exists for component: CPKMB  No results found for: CHOL, CHOLX, CHLST, CHOLV, HDL, HDLP, LDL, LDLC, DLDLP, TGLX, TRIGL, TRIGP, CHHD, CHHDX  Lab Results   Component Value Date/Time    Glucose (POC) 123 (H) 01/14/2021 07:49 AM    Glucose (POC) 86 01/14/2021 06:28 AM    Glucose (POC) 65 (L) 01/14/2021 05:55 AM    Glucose (POC) 76 01/13/2021 09:53 PM    Glucose (POC) 88 01/13/2021 04:04 PM     Lab Results   Component Value Date/Time    Color DARK YELLOW 01/10/2021 12:40 PM    Appearance CLOUDY 01/10/2021 12:40 PM    Specific gravity 1.018 01/10/2021 12:40 PM    pH (UA) 5.0 01/10/2021 12:40 PM    Protein 30 (A) 01/10/2021 12:40 PM    Glucose Negative 01/10/2021 12:40 PM    Ketone TRACE (A) 01/10/2021 12:40 PM    Bilirubin Negative 01/10/2021 12:40 PM    Urobilinogen 0.2 01/10/2021 12:40 PM    Nitrites Negative 01/10/2021 12:40 PM    Leukocyte Esterase Negative 01/10/2021 12:40 PM    Bacteria 1+ (A) 01/10/2021 12:40 PM    WBC 0 to 3 01/10/2021 12:40 PM    RBC 0 to 1 01/10/2021 12:40 PM         Medications Reviewed:     Current Facility-Administered Medications   Medication Dose Route Frequency    insulin glargine (LANTUS) injection 18 Units  18 Units SubCUTAneous QHS    enoxaparin (LOVENOX) injection 90 mg  90 mg SubCUTAneous Q12H    famotidine (PEPCID) tablet 20 mg  20 mg Oral BID    anidulafungin (ERAXIS) 100 mg in 0.9% sodium chloride 130 mL IVPB  100 mg IntraVENous Q24H    melatonin tablet 6 mg  6 mg Oral QHS PRN    oxyCODONE-acetaminophen (PERCOCET) 5-325 mg per tablet 1 Tab  1 Tab Oral Q6H PRN    nicotine (NICODERM CQ) 14 mg/24 hr patch 1 Patch  1 Patch TransDERmal DAILY    FLUoxetine (PROzac) capsule 80 mg  80 mg Oral DAILY    QUEtiapine (SEROquel) tablet 400 mg  400 mg Oral Q12H    sodium chloride (NS) flush 5-40 mL  5-40 mL IntraVENous Q8H    sodium chloride (NS) flush 5-40 mL  5-40 mL IntraVENous PRN    acetaminophen (TYLENOL) tablet 650 mg  650 mg Oral Q6H PRN    Or    acetaminophen (TYLENOL) suppository 650 mg  650 mg Rectal Q6H PRN    polyethylene glycol (MIRALAX) packet 17 g  17 g Oral DAILY PRN    promethazine (PHENERGAN) tablet 12.5 mg  12.5 mg Oral Q6H PRN    Or    ondansetron (ZOFRAN) injection 4 mg  4 mg IntraVENous Q6H PRN    insulin lispro (HUMALOG) injection   SubCUTAneous AC&HS    glucose chewable tablet 16 g  4 Tab Oral PRN    glucagon (GLUCAGEN) injection 1 mg  1 mg IntraMUSCular PRN    dextrose (D50) infusion 12.5-25 g  25-50 mL IntraVENous PRN     ______________________________________________________________________  EXPECTED LENGTH OF STAY: 6d 18h  ACTUAL LENGTH OF STAY:          5                 Shirley Fletcher MD

## 2021-01-14 NOTE — ROUTINE PROCESS
1815 Patient transferred from ICU, alert and oriented x 4. Telemetry monitoring continued. Patient advised on room, TV control and telephone. Place item within reach. 1920 Bedside and Verbal shift change report given to [address] [place] [address] (oncoming nurse) by Essence Cramer RN (offgoing nurse). Report included the following information SBAR, Kardex, Intake/Output, MAR, Recent Results and Cardiac Rhythm NSR.

## 2021-01-14 NOTE — PROGRESS NOTES
-- Bedside, Verbal and Written shift change report given to [address] [place] [address] (oncoming nurse) by Sulaiman Zuniga- DAYANA (offgoing nurse). Report included the following information SBAR, Kardex, Intake/Output, MAR and Recent Results. 2200-- PM medications administered, pt tolerated with ease, will continue to monitor.     --2153--Pt BS 76. Pt provided with apple juice. --0528--Critical lab value of BS 55 received from lab.     --0530--In Pt room, Pt is asymptomatic. Apple juice provided will recheck blood sugar in 15min. ---0555--BS 65. Pt provided with a snack. Will repeat BS in 15min. --0628--BS 86.          -- Bedside, Verbal and Written shift change report given to -Jessica ANNE (oncoming nurse) Arne Severs RN (offgoing nurse). Report included the following information SBAR, Kardex, Intake/Output, MAR and Recent Results. Skin assessment completed.

## 2021-01-14 NOTE — ROUTINE PROCESS
0720  -- Bedside, Verbal and Written shift change report received by [facility] [employer] (oncoming nurse) by [employer] nurse). Allergy band placed on pt's wrist. Report included the following information SBAR, Kardex, Intake/Output, MAR and Recent Results. 1796- Assessment completed, call bell within reach,no distress noted, voiced no complaints at this time. 0915  -- PM  medications administered, pt tolerated with ease, will continue to monitor. Medicated for pain per patient request.  
 1200-- Shift reassessment, pt condition unchanged, will continue to monitor. 1400-resting quietly in bed, no distress noted. 1600 --  Shift reassessment, pt condition unchanged, will continue to monitor. 1950 -- Bedside, Verbal and Written shift change report given to Mj(oncoming nurse) by [facility] [employer] (offgoing nurse). Report included the following information SBAR, Kardex, Intake/Output, MAR and Recent Results. Skin assessment completed.

## 2021-01-15 LAB
ALBUMIN SERPL-MCNC: 2.4 G/DL (ref 3.4–5)
ANION GAP SERPL CALC-SCNC: 6 MMOL/L (ref 3–18)
BACTERIA SPEC CULT: NORMAL
BASOPHILS # BLD: 0 K/UL (ref 0–0.1)
BASOPHILS NFR BLD: 0 % (ref 0–3)
BUN SERPL-MCNC: 11 MG/DL (ref 7–18)
BUN/CREAT SERPL: 12 (ref 12–20)
CALCIUM SERPL-MCNC: 8.3 MG/DL (ref 8.5–10.1)
CHLORIDE SERPL-SCNC: 109 MMOL/L (ref 100–111)
CO2 SERPL-SCNC: 27 MMOL/L (ref 21–32)
CREAT SERPL-MCNC: 0.94 MG/DL (ref 0.6–1.3)
DIFFERENTIAL METHOD BLD: ABNORMAL
EOSINOPHIL # BLD: 0.6 K/UL (ref 0–0.4)
EOSINOPHIL NFR BLD: 6 % (ref 0–5)
ERYTHROCYTE [DISTWIDTH] IN BLOOD BY AUTOMATED COUNT: 15.5 % (ref 11.6–14.5)
GLUCOSE BLD STRIP.AUTO-MCNC: 62 MG/DL (ref 70–110)
GLUCOSE BLD STRIP.AUTO-MCNC: 74 MG/DL (ref 70–110)
GLUCOSE BLD STRIP.AUTO-MCNC: 76 MG/DL (ref 70–110)
GLUCOSE BLD STRIP.AUTO-MCNC: 79 MG/DL (ref 70–110)
GLUCOSE BLD STRIP.AUTO-MCNC: 86 MG/DL (ref 70–110)
GLUCOSE BLD STRIP.AUTO-MCNC: 98 MG/DL (ref 70–110)
GLUCOSE SERPL-MCNC: 55 MG/DL (ref 74–99)
HCT VFR BLD AUTO: 32.3 % (ref 36–48)
HGB BLD-MCNC: 9.8 G/DL (ref 13–16)
LYMPHOCYTES # BLD: 3.7 K/UL (ref 0.8–3.5)
LYMPHOCYTES NFR BLD: 37 % (ref 20–51)
MAGNESIUM SERPL-MCNC: 2 MG/DL (ref 1.6–2.6)
MCH RBC QN AUTO: 27.5 PG (ref 24–34)
MCHC RBC AUTO-ENTMCNC: 30.3 G/DL (ref 31–37)
MCV RBC AUTO: 90.7 FL (ref 74–97)
METAMYELOCYTES NFR BLD MANUAL: 1 %
MONOCYTES # BLD: 0.7 K/UL (ref 0–1)
MONOCYTES NFR BLD: 7 % (ref 2–9)
MYELOCYTES NFR BLD MANUAL: 3 %
NEUTS BAND NFR BLD MANUAL: 2 % (ref 0–5)
NEUTS SEG # BLD: 4.4 K/UL (ref 1.8–8)
NEUTS SEG NFR BLD: 44 % (ref 42–75)
PHOSPHATE SERPL-MCNC: 4.4 MG/DL (ref 2.5–4.9)
PLATELET # BLD AUTO: 362 K/UL (ref 135–420)
PMV BLD AUTO: 11.5 FL (ref 9.2–11.8)
POTASSIUM SERPL-SCNC: 4.2 MMOL/L (ref 3.5–5.5)
RBC # BLD AUTO: 3.56 M/UL (ref 4.7–5.5)
RBC MORPH BLD: ABNORMAL
SERVICE CMNT-IMP: NORMAL
SODIUM SERPL-SCNC: 142 MMOL/L (ref 136–145)
WBC # BLD AUTO: 9.9 K/UL (ref 4.6–13.2)

## 2021-01-15 PROCEDURE — 85025 COMPLETE CBC W/AUTO DIFF WBC: CPT

## 2021-01-15 PROCEDURE — 74011250637 HC RX REV CODE- 250/637: Performed by: HOSPITALIST

## 2021-01-15 PROCEDURE — 74011250637 HC RX REV CODE- 250/637: Performed by: INTERNAL MEDICINE

## 2021-01-15 PROCEDURE — 74011250636 HC RX REV CODE- 250/636: Performed by: INTERNAL MEDICINE

## 2021-01-15 PROCEDURE — 80069 RENAL FUNCTION PANEL: CPT

## 2021-01-15 PROCEDURE — 2709999900 HC NON-CHARGEABLE SUPPLY

## 2021-01-15 PROCEDURE — 74011636637 HC RX REV CODE- 636/637: Performed by: INTERNAL MEDICINE

## 2021-01-15 PROCEDURE — 74011000258 HC RX REV CODE- 258: Performed by: INTERNAL MEDICINE

## 2021-01-15 PROCEDURE — 65660000000 HC RM CCU STEPDOWN

## 2021-01-15 PROCEDURE — 83735 ASSAY OF MAGNESIUM: CPT

## 2021-01-15 PROCEDURE — 82962 GLUCOSE BLOOD TEST: CPT

## 2021-01-15 RX ADMIN — ENOXAPARIN SODIUM 90 MG: 100 INJECTION SUBCUTANEOUS at 01:09

## 2021-01-15 RX ADMIN — SODIUM CHLORIDE 100 MG: 9 INJECTION, SOLUTION INTRAVENOUS at 17:17

## 2021-01-15 RX ADMIN — OXYCODONE AND ACETAMINOPHEN 1 TABLET: 5; 325 TABLET ORAL at 18:03

## 2021-01-15 RX ADMIN — SODIUM CHLORIDE 10 ML: 9 INJECTION, SOLUTION INTRAMUSCULAR; INTRAVENOUS; SUBCUTANEOUS at 15:32

## 2021-01-15 RX ADMIN — FLUOXETINE 80 MG: 20 CAPSULE ORAL at 08:07

## 2021-01-15 RX ADMIN — OXYCODONE AND ACETAMINOPHEN 1 TABLET: 5; 325 TABLET ORAL at 23:36

## 2021-01-15 RX ADMIN — INSULIN GLARGINE 18 UNITS: 100 INJECTION, SOLUTION SUBCUTANEOUS at 21:47

## 2021-01-15 RX ADMIN — FAMOTIDINE 20 MG: 20 TABLET ORAL at 17:17

## 2021-01-15 RX ADMIN — QUETIAPINE FUMARATE 400 MG: 200 TABLET ORAL at 21:46

## 2021-01-15 RX ADMIN — FAMOTIDINE 20 MG: 20 TABLET ORAL at 08:08

## 2021-01-15 RX ADMIN — SODIUM CHLORIDE 10 ML: 9 INJECTION, SOLUTION INTRAMUSCULAR; INTRAVENOUS; SUBCUTANEOUS at 05:56

## 2021-01-15 RX ADMIN — OXYCODONE AND ACETAMINOPHEN 1 TABLET: 5; 325 TABLET ORAL at 11:51

## 2021-01-15 RX ADMIN — ENOXAPARIN SODIUM 90 MG: 100 INJECTION SUBCUTANEOUS at 15:29

## 2021-01-15 RX ADMIN — SODIUM CHLORIDE 10 ML: 9 INJECTION, SOLUTION INTRAMUSCULAR; INTRAVENOUS; SUBCUTANEOUS at 21:47

## 2021-01-15 RX ADMIN — OXYCODONE AND ACETAMINOPHEN 1 TABLET: 5; 325 TABLET ORAL at 04:38

## 2021-01-15 RX ADMIN — QUETIAPINE FUMARATE 400 MG: 200 TABLET ORAL at 08:07

## 2021-01-15 NOTE — PROGRESS NOTES
Problem: Diabetes Self-Management  Goal: *Disease process and treatment process  Description: Define diabetes and identify own type of diabetes; list 3 options for treating diabetes. Outcome: Progressing Towards Goal  Goal: *Incorporating nutritional management into lifestyle  Description: Describe effect of type, amount and timing of food on blood glucose; list 3 methods for planning meals. Outcome: Progressing Towards Goal  Goal: *Incorporating physical activity into lifestyle  Description: State effect of exercise on blood glucose levels. Outcome: Progressing Towards Goal  Goal: *Developing strategies to promote health/change behavior  Description: Define the ABC's of diabetes; identify appropriate screenings, schedule and personal plan for screenings. Outcome: Progressing Towards Goal  Goal: *Using medications safely  Description: State effect of diabetes medications on diabetes; name diabetes medication taking, action and side effects. Outcome: Progressing Towards Goal  Goal: *Monitoring blood glucose, interpreting and using results  Description: Identify recommended blood glucose targets  and personal targets. Outcome: Progressing Towards Goal  Goal: *Prevention, detection, treatment of acute complications  Description: List symptoms of hyper- and hypoglycemia; describe how to treat low blood sugar and actions for lowering  high blood glucose level. Outcome: Progressing Towards Goal  Goal: *Prevention, detection and treatment of chronic complications  Description: Define the natural course of diabetes and describe the relationship of blood glucose levels to long term complications of diabetes.   Outcome: Progressing Towards Goal  Goal: *Developing strategies to address psychosocial issues  Description: Describe feelings about living with diabetes; identify support needed and support network  Outcome: Progressing Towards Goal  Goal: *Insulin pump training  Outcome: Progressing Towards Goal  Goal: *Sick day guidelines  Outcome: Progressing Towards Goal  Goal: *Patient Specific Goal (EDIT GOAL, INSERT TEXT)  Outcome: Progressing Towards Goal     Problem: Patient Education: Go to Patient Education Activity  Goal: Patient/Family Education  Outcome: Progressing Towards Goal     Problem: Falls - Risk of  Goal: *Absence of Falls  Description: Document Betty Mess Fall Risk and appropriate interventions in the flowsheet. Outcome: Progressing Towards Goal  Note: Fall Risk Interventions:  Mobility Interventions: Bed/chair exit alarm, Communicate number of staff needed for ambulation/transfer, Patient to call before getting OOB         Medication Interventions: Teach patient to arise slowly    Elimination Interventions: Call light in reach, Urinal in reach              Problem: Patient Education: Go to Patient Education Activity  Goal: Patient/Family Education  Outcome: Progressing Towards Goal     Problem: Discharge Planning  Goal: *Discharge to safe environment  Outcome: Progressing Towards Goal     Problem: Pain  Goal: *Control of Pain  Outcome: Progressing Towards Goal  Goal: *PALLIATIVE CARE:  Alleviation of Pain  Outcome: Progressing Towards Goal     Problem: Patient Education: Go to Patient Education Activity  Goal: Patient/Family Education  Outcome: Progressing Towards Goal     Problem: Pressure Injury - Risk of  Goal: *Prevention of pressure injury  Description: Document Erickson Scale and appropriate interventions in the flowsheet. Outcome: Progressing Towards Goal  Note: Pressure Injury Interventions:             Activity Interventions: Increase time out of bed    Mobility Interventions: HOB 30 degrees or less, Pressure redistribution bed/mattress (bed type)    Nutrition Interventions: Document food/fluid/supplement intake                     Problem: Patient Education: Go to Patient Education Activity  Goal: Patient/Family Education  Outcome: Progressing Towards Goal

## 2021-01-15 NOTE — PROGRESS NOTES
Spoke with rosa m from [facility]. He states they can accept pt with a mid line cath, rec called from yesy a midline cath had been ordered. Messaged dr Mars Man, to see if pt will dc over wkend.

## 2021-01-15 NOTE — PROGRESS NOTES
rec call back from rosa m [facility] can accept pt back on weekend. Cm needs to call [phone] to notify when returning. He will go to rm 240 there  Pt has scott not able to place on will call.

## 2021-01-15 NOTE — ROUTINE PROCESS
1951: Assumed care. Awake. Quietly resting in bed. Call light within reach. 2106: Due med given. Medicated for pain per patient request.  
 
9076: Due med given. BS 84. HS snack provided. 2350: No change from previous assessment. 0200: Sleeping. 4737: No change from previous assessment. Medicated for pain per patient request.  
 
6110: Slept good thru night. Needs attended. No change from previous assessment. 3340: Due meds given. BS 74.  
 
3650:Bedside and Verbal shift change report given to Terrence ANNE (oncoming nurse) by me (offgoing nurse).  Report included the following information SBAR, Kardex, Intake/Output, MAR, Recent Results and Cardiac Rhythm SR.

## 2021-01-15 NOTE — PROGRESS NOTES
Received pt from DAYANA Ocampo. Pt awake, alert. No complaints at this time.    1803: Administered Percocet for 8/10 general right-sided pain.    1914: Bedside shift change report given to Cierra (oncoming nurse) by Terra (offgoing nurse). Report included the following information SBAR, Kardex, Intake/Output and Cardiac Rhythm NSR.

## 2021-01-15 NOTE — PROGRESS NOTES
0965 Report received from [facility] using SBAR and MAR.     1005 Upon entering pt room, pt sleeping. Allowed him to rest. Call bell in reach. 1121 Informed by Reymundo Sarkar CNA of POC glucose 62. Pt alert and oriented. Pt provided 240 ml of apple juice to drink. 1145 Repeat POC glucose 98.     1151 Pt requesting pain medication. PO percocet administered for complaint of 8/10 right leg sharp pains. 1230 Pt out of bed to bath. 1251 Pt returned to bed. Pain reassessed as 7/7. Pt left to rest in bed. Pt ate 100% of lunch. Call bell in reach. 7101 [facility] nurse, of order for a midline to be placed written this morning. Contacted  to possibly get an idea of when pt is to be discharged to the [place]. If pt is not discharged until Monday, pt may be able to get a PICC line. This writer contacted the Cath lab and was told they are not able to do any add on procedures today and if PICC needed, it will have to wait until Monday. Will clarify with MD, , and nursing supervisor/PICC nurse on when midline should be placed. 1351 Informed by  that it is likely that the pt will be discharged over the weekend. Informed Bon Mckinney, nursing supervisor/PICC nurse that pt is to get midline and that the cath lab is not available. Was told by nursing supervisor that only cath lab put in midlines and PICC lines on Monday and Fridays and the pt will have to wait until then. There is no on call on the weekends and that PICC nurse is only on call on Tuesdays-Thursdays. 1430 Spoke with tech from the cath lab and was informed that the cath lab does not insert midlines no matter what day it is. Relayed this information to unit nursing director. Paged Dr. Aury Rodgers to inform of situation of midline line not placed today and if he would consider a PICC being placed on Monday. 1500 Received call back from Dr. Aury Rodgers.  Informed him that midline can not be placed. MD stated he will decide what to do tomorrow after speaking with the infectious disease doctor. 1643 Bedside and Verbal shift change report given to Felisa Degroot (oncoming nurse) by Sharmila Khanna RN   (offgoing nurse). Report included the following information SBAR and MAR.

## 2021-01-15 NOTE — PROGRESS NOTES
Hospitalist Progress Note             Date of Service:  1/15/2021  NAME:  Azalia De La Fuente  :  1965  MRN:  [id number]    Assessment & Plan:      Septic shock/septicemia- due to PICC line  - yeast growing in bld cult - started on anidulafungin- will need 2 weeks iv antifungal, starting , as long as blood cultures remain negative,   - downgrade abx to ceftriaxone  - wbc improved wo wnl  - ct scan leg w/o contrast reviewed, no acute osteo seen, no abcess  - sp levophed  - lactic acid has improved   - sp central line  - sp kline  - reviewed id not, awaiting full yeast profile= CANDIDA DUBLINIENSIS GROWING IN THE AEROBIC BOTTLE, abx complete today, will need iv antifungal, may need another midline/piccline, opposite arm     Picc related upper extremity DVT- lue  - cont lovenox, likley dc on short course noac, especially since may have to dc with another picc     DM2  - cont lantus, lispro, ins ss  - hypoglycemia this am, cont lantus 18     Psych- schizoaffective  - cont prozac, seroquel     Acute Kidney Injury  - resolved, dc ivf     Anemia  - suspect due to acute illness and infection  - hgb at 9.8, normocytic  - transfuse < 7        Code status: full  DVT prophylaxis:         Hospital Problems  Never Reviewed          Codes Class Noted POA    PICC line infection ICD-10-CM: T80.219A  ICD-9-CM: 999.31  2021 Unknown        * (Principal) Septic shock ([facility] ICD-10-CM: A41.9, R65.21  ICD-9-CM: 038.9, 785.52, 995.92  2021 Unknown        Type 2 diabetes mellitus (HCC) ICD-10-CM: E11.9  ICD-9-CM: 250.00  Unknown Unknown        Schizoaffective disorder, bipolar type ([facility] ICD-10-CM: F25.0  ICD-9-CM: 295.70  Unknown Unknown        Hyperlipidemia ICD-10-CM: E78.5  ICD-9-CM: 272.4  Unknown Unknown        Infected hardware in right leg ([facility] ICD-10-CM: T84. 7XXA  ICD-9-CM: 996.67  Unknown Unknown        Lactic acidosis ICD-10-CM: E87.2  ICD-9-CM: 276.2  Unknown Unknown        Acute renal failure (ARF) ([facility] ICD-10-CM: N17.9  ICD-9-CM: 584.9  1/9/2021 Unknown        Hypomagnesemia ICD-10-CM: I08.81  ICD-9-CM: 275.2  1/9/2021 Unknown                Review of Systems:   A comprehensive review of systems was negative except for that written in the HPI. Pt says cannot sleep without seroquel        Vital Signs:    Last 24hrs VS reviewed since prior progress note. Most recent are:  Visit Vitals  BP (!) 162/103   Pulse 73   Temp 98.5 °F (36.9 °C)   Resp 18   Ht 5' 7\" (1.702 m)   Wt 93 kg (205 lb)   SpO2 98%   BMI 32.11 kg/m²         Intake/Output Summary (Last 24 hours) at 1/15/2021 0851  Last data filed at 1/15/2021 0810  Gross per 24 hour   Intake 360 ml   Output    Net 360 ml        Physical Examination:       General:          Alert, cooperative, no distress, appears stated age.     HEENT:           Atraumatic, anicteric sclerae, pink conjunctivae                          No oral ulcers, mucosa moist, throat clear, dentition fair  Neck:               Supple, symmetrical  Lungs:             Clear to auscultation bilaterally.  No Wheezing or Rhonchi. No rales. Chest wall:      No tenderness  No Accessory muscle use. Heart:              Regular  rhythm,  No  murmur   No edema  Abdomen:        Soft, non-tender. Not distended.  Bowel sounds normal  Extremities:     cental line remains in L groin  Skin:                r le with recent surgical wound healing  Psych:             Not anxious or agitated.   Neurologic:      Alert, moves all extremities, answers questions appropriately and responds to commands                 Data Review:    Review and/or order of clinical lab test  Review and/or order of tests in the radiology section of CPT  Review and/or order of tests in the medicine section of CPT      Labs:     Recent Labs     01/15/21  0425 01/14/21  0355   WBC 9.9 10.8   HGB 9.8* 8.4*   HCT 32.3* 27.2*    285     Recent Labs 01/15/21  0425 01/14/21  0355 01/13/21  0440    142 145   K 4.2 3.9 3.8    111 111   CO2 27 24 25   BUN 11 13 13   CREA 0.94 0.83 0.98   GLU 55* 54* 80   CA 8.3* 8.1* 7.8*   MG 2.0 1.8 1.7   PHOS 4.4 3.9 3.3     Recent Labs     01/15/21  0425 01/14/21  0355 01/13/21  0440   ALB 2.4* 2.1* 2.0*     Recent Labs     01/14/21 0355 01/13/21  0440   APTT 64.7* 70.5*      No results for input(s): FE, TIBC, PSAT, FERR in the last 72 hours. No results found for: FOL, RBCF   No results for input(s): PH, PCO2, PO2 in the last 72 hours. No results for input(s): CPK, CKNDX, TROIQ in the last 72 hours.     No lab exists for component: CPKMB  No results found for: CHOL, CHOLX, CHLST, CHOLV, HDL, HDLP, LDL, LDLC, DLDLP, TGLX, TRIGL, TRIGP, CHHD, CHHDX  Lab Results   Component Value Date/Time    Glucose (POC) 74 01/15/2021 07:44 AM    Glucose (POC) 84 01/14/2021 09:37 PM    Glucose (POC) 95 01/14/2021 04:45 PM    Glucose (POC) 126 (H) 01/14/2021 11:54 AM    Glucose (POC) 123 (H) 01/14/2021 07:49 AM     Lab Results   Component Value Date/Time    Color DARK YELLOW 01/10/2021 12:40 PM    Appearance CLOUDY 01/10/2021 12:40 PM    Specific gravity 1.018 01/10/2021 12:40 PM    pH (UA) 5.0 01/10/2021 12:40 PM    Protein 30 (A) 01/10/2021 12:40 PM    Glucose Negative 01/10/2021 12:40 PM    Ketone TRACE (A) 01/10/2021 12:40 PM    Bilirubin Negative 01/10/2021 12:40 PM    Urobilinogen 0.2 01/10/2021 12:40 PM    Nitrites Negative 01/10/2021 12:40 PM    Leukocyte Esterase Negative 01/10/2021 12:40 PM    Bacteria 1+ (A) 01/10/2021 12:40 PM    WBC 0 to 3 01/10/2021 12:40 PM    RBC 0 to 1 01/10/2021 12:40 PM         Medications Reviewed:     Current Facility-Administered Medications   Medication Dose Route Frequency    insulin glargine (LANTUS) injection 18 Units  18 Units SubCUTAneous QHS    calcium carbonate (TUMS) chewable tablet 400 mg [elemental]  400 mg Oral Q6H PRN    alum-mag hydroxide-simeth (MYLANTA) oral suspension 30 mL  30 mL Oral Q4H PRN    lip treatment topical (CHAPSTICK)   Topical PRN    anidulafungin (ERAXIS) 100 mg in 0.9% sodium chloride 130 mL IVPB  100 mg IntraVENous Q24H    QUEtiapine (SEROquel) tablet 400 mg  400 mg Oral Q12H    enoxaparin (LOVENOX) injection 90 mg  90 mg SubCUTAneous Q12H    famotidine (PEPCID) tablet 20 mg  20 mg Oral BID    melatonin tablet 6 mg  6 mg Oral QHS PRN    oxyCODONE-acetaminophen (PERCOCET) 5-325 mg per tablet 1 Tab  1 Tab Oral Q6H PRN    nicotine (NICODERM CQ) 14 mg/24 hr patch 1 Patch  1 Patch TransDERmal DAILY    FLUoxetine (PROzac) capsule 80 mg  80 mg Oral DAILY    sodium chloride (NS) flush 5-40 mL  5-40 mL IntraVENous Q8H    sodium chloride (NS) flush 5-40 mL  5-40 mL IntraVENous PRN    acetaminophen (TYLENOL) tablet 650 mg  650 mg Oral Q6H PRN    Or    acetaminophen (TYLENOL) suppository 650 mg  650 mg Rectal Q6H PRN    polyethylene glycol (MIRALAX) packet 17 g  17 g Oral DAILY PRN    promethazine (PHENERGAN) tablet 12.5 mg  12.5 mg Oral Q6H PRN    Or    ondansetron (ZOFRAN) injection 4 mg  4 mg IntraVENous Q6H PRN    insulin lispro (HUMALOG) injection   SubCUTAneous AC&HS    glucose chewable tablet 16 g  4 Tab Oral PRN    glucagon (GLUCAGEN) injection 1 mg  1 mg IntraMUSCular PRN    dextrose (D50) infusion 12.5-25 g  25-50 mL IntraVENous PRN     ______________________________________________________________________  EXPECTED LENGTH OF STAY: 6d 18h  ACTUAL LENGTH OF STAY:          6                 Nikolas Garcia MD

## 2021-01-15 NOTE — PROGRESS NOTES
Called rosa m at [facility], notified pt needs 2 wk of anidulafungin, can he just keep peripheral line since prev picc got infected. He will call me back    Spoke with pt ok to return to [facility], he is not a long term pt there, he will leave after  Iv's completed.
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
## 2021-01-16 LAB
ALBUMIN SERPL-MCNC: 2.2 G/DL (ref 3.4–5)
ANION GAP SERPL CALC-SCNC: 8 MMOL/L (ref 3–18)
APTT PPP: 40.1 SEC (ref 23–36.4)
BASOPHILS # BLD: 0 K/UL (ref 0–0.1)
BASOPHILS NFR BLD: 0 % (ref 0–3)
BUN SERPL-MCNC: 8 MG/DL (ref 7–18)
BUN/CREAT SERPL: 9 (ref 12–20)
CALCIUM SERPL-MCNC: 8.2 MG/DL (ref 8.5–10.1)
CHLORIDE SERPL-SCNC: 108 MMOL/L (ref 100–111)
CO2 SERPL-SCNC: 26 MMOL/L (ref 21–32)
CREAT SERPL-MCNC: 0.87 MG/DL (ref 0.6–1.3)
DIFFERENTIAL METHOD BLD: ABNORMAL
EOSINOPHIL # BLD: 0.4 K/UL (ref 0–0.4)
EOSINOPHIL NFR BLD: 5 % (ref 0–5)
ERYTHROCYTE [DISTWIDTH] IN BLOOD BY AUTOMATED COUNT: 15.8 % (ref 11.6–14.5)
GLUCOSE BLD STRIP.AUTO-MCNC: 104 MG/DL (ref 70–110)
GLUCOSE BLD STRIP.AUTO-MCNC: 130 MG/DL (ref 70–110)
GLUCOSE BLD STRIP.AUTO-MCNC: 52 MG/DL (ref 70–110)
GLUCOSE BLD STRIP.AUTO-MCNC: 61 MG/DL (ref 70–110)
GLUCOSE BLD STRIP.AUTO-MCNC: 77 MG/DL (ref 70–110)
GLUCOSE BLD STRIP.AUTO-MCNC: 86 MG/DL (ref 70–110)
GLUCOSE BLD STRIP.AUTO-MCNC: 97 MG/DL (ref 70–110)
GLUCOSE SERPL-MCNC: 43 MG/DL (ref 74–99)
HCT VFR BLD AUTO: 30.6 % (ref 36–48)
HGB BLD-MCNC: 9.4 G/DL (ref 13–16)
LYMPHOCYTES # BLD: 2 K/UL (ref 0.8–3.5)
LYMPHOCYTES NFR BLD: 25 % (ref 20–51)
MAGNESIUM SERPL-MCNC: 1.7 MG/DL (ref 1.6–2.6)
MCH RBC QN AUTO: 27.6 PG (ref 24–34)
MCHC RBC AUTO-ENTMCNC: 30.7 G/DL (ref 31–37)
MCV RBC AUTO: 90 FL (ref 74–97)
METAMYELOCYTES NFR BLD MANUAL: 2 %
MONOCYTES # BLD: 0.8 K/UL (ref 0–1)
MONOCYTES NFR BLD: 10 % (ref 2–9)
NEUTS BAND NFR BLD MANUAL: 5 % (ref 0–5)
NEUTS SEG # BLD: 4.2 K/UL (ref 1.8–8)
NEUTS SEG NFR BLD: 53 % (ref 42–75)
PHOSPHATE SERPL-MCNC: 4.1 MG/DL (ref 2.5–4.9)
PLATELET # BLD AUTO: 321 K/UL (ref 135–420)
PMV BLD AUTO: 10.5 FL (ref 9.2–11.8)
POTASSIUM SERPL-SCNC: 3.9 MMOL/L (ref 3.5–5.5)
RBC # BLD AUTO: 3.4 M/UL (ref 4.7–5.5)
RBC MORPH BLD: ABNORMAL
RBC MORPH BLD: ABNORMAL
SODIUM SERPL-SCNC: 142 MMOL/L (ref 136–145)
WBC # BLD AUTO: 8 K/UL (ref 4.6–13.2)

## 2021-01-16 PROCEDURE — 83735 ASSAY OF MAGNESIUM: CPT

## 2021-01-16 PROCEDURE — 74011250636 HC RX REV CODE- 250/636: Performed by: INTERNAL MEDICINE

## 2021-01-16 PROCEDURE — 74011250637 HC RX REV CODE- 250/637: Performed by: HOSPITALIST

## 2021-01-16 PROCEDURE — 65660000000 HC RM CCU STEPDOWN

## 2021-01-16 PROCEDURE — 74011250637 HC RX REV CODE- 250/637: Performed by: INTERNAL MEDICINE

## 2021-01-16 PROCEDURE — 74011000258 HC RX REV CODE- 258: Performed by: INTERNAL MEDICINE

## 2021-01-16 PROCEDURE — 36415 COLL VENOUS BLD VENIPUNCTURE: CPT

## 2021-01-16 PROCEDURE — 82962 GLUCOSE BLOOD TEST: CPT

## 2021-01-16 PROCEDURE — 80069 RENAL FUNCTION PANEL: CPT

## 2021-01-16 PROCEDURE — 85730 THROMBOPLASTIN TIME PARTIAL: CPT

## 2021-01-16 PROCEDURE — 85025 COMPLETE CBC W/AUTO DIFF WBC: CPT

## 2021-01-16 RX ORDER — INSULIN GLARGINE 100 [IU]/ML
12 INJECTION, SOLUTION SUBCUTANEOUS
Status: DISCONTINUED | OUTPATIENT
Start: 2021-01-16 | End: 2021-01-16

## 2021-01-16 RX ADMIN — SODIUM CHLORIDE 100 MG: 9 INJECTION, SOLUTION INTRAVENOUS at 16:01

## 2021-01-16 RX ADMIN — OXYCODONE AND ACETAMINOPHEN 1 TABLET: 5; 325 TABLET ORAL at 08:25

## 2021-01-16 RX ADMIN — OXYCODONE AND ACETAMINOPHEN 1 TABLET: 5; 325 TABLET ORAL at 21:39

## 2021-01-16 RX ADMIN — ENOXAPARIN SODIUM 90 MG: 100 INJECTION SUBCUTANEOUS at 02:45

## 2021-01-16 RX ADMIN — SODIUM CHLORIDE 10 ML: 9 INJECTION, SOLUTION INTRAMUSCULAR; INTRAVENOUS; SUBCUTANEOUS at 06:00

## 2021-01-16 RX ADMIN — OXYCODONE AND ACETAMINOPHEN 1 TABLET: 5; 325 TABLET ORAL at 15:48

## 2021-01-16 RX ADMIN — SODIUM CHLORIDE 10 ML: 9 INJECTION, SOLUTION INTRAMUSCULAR; INTRAVENOUS; SUBCUTANEOUS at 21:39

## 2021-01-16 RX ADMIN — ENOXAPARIN SODIUM 90 MG: 100 INJECTION SUBCUTANEOUS at 14:24

## 2021-01-16 RX ADMIN — QUETIAPINE FUMARATE 400 MG: 200 TABLET ORAL at 08:25

## 2021-01-16 RX ADMIN — SODIUM CHLORIDE 10 ML: 9 INJECTION, SOLUTION INTRAMUSCULAR; INTRAVENOUS; SUBCUTANEOUS at 14:25

## 2021-01-16 RX ADMIN — QUETIAPINE FUMARATE 400 MG: 200 TABLET ORAL at 21:39

## 2021-01-16 RX ADMIN — FAMOTIDINE 20 MG: 20 TABLET ORAL at 08:26

## 2021-01-16 RX ADMIN — FLUOXETINE 80 MG: 20 CAPSULE ORAL at 08:26

## 2021-01-16 NOTE — PROGRESS NOTES
Hospitalist Progress Note             Date of Service:  2021  NAME:  Mckenzie Mae  :  1965  MRN:  [id number]    Assessment & Plan:      Septic shock/septicemia- due to PICC line  - yeast growing in bld cult - started on anidulafungin- will need 2 weeks iv antifungal, starting , as long as blood cultures remain negative,   - downgrade abx to ceftriaxone  - wbc improved wo wnl  - ct scan leg w/o contrast reviewed, no acute osteo seen, no abcess  - sp levophed  - lactic acid has improved   - sp central line  - sp kline  - reviewed id not, yeast profile= CANDIDA DUBLINIENSIS GROWING IN THE AEROBIC BOTTLE, abx complete today, will need iv antifungal, PICC line ordered, when placed pt will be appropriate for dishcarge     Picc related upper extremity DVT- lue  - cont lovenox, likley dc on short course noac, especially since may have to dc with another picc     DM2  - cont lantus, lispro, ins ss  - hypoglycemia this am, cont lantus 18     Psych- schizoaffective  - cont prozac, seroquel     Acute Kidney Injury  - resolved, dc ivf     Anemia  - suspect due to acute illness and infection  - hgb at 9.4, normocytic  - transfuse < 7        Code status: full  DVT prophylaxis:         Hospital Problems  Never Reviewed          Codes Class Noted POA    PICC line infection ICD-10-CM: T80.219A  ICD-9-CM: 999.31  2021 Unknown        * (Principal) Septic shock ([facility] ICD-10-CM: A41.9, R65.21  ICD-9-CM: 038.9, 785.52, 995.92  2021 Unknown        Type 2 diabetes mellitus (HCC) ICD-10-CM: E11.9  ICD-9-CM: 250.00  Unknown Unknown        Schizoaffective disorder, bipolar type ([facility] ICD-10-CM: F25.0  ICD-9-CM: 295.70  Unknown Unknown        Hyperlipidemia ICD-10-CM: E78.5  ICD-9-CM: 272.4  Unknown Unknown        Infected hardware in right leg ([facility] ICD-10-CM: T84. 7XXA  ICD-9-CM: 996.67  Unknown Unknown        Lactic acidosis ICD-10-CM: E87.2  ICD-9-CM: 276.2  Unknown Unknown        Acute renal failure (ARF) ([facility] ICD-10-CM: N17.9  ICD-9-CM: 584.9  1/9/2021 Unknown        Hypomagnesemia ICD-10-CM: Y15.37  ICD-9-CM: 275.2  1/9/2021 Unknown                Review of Systems:   A comprehensive review of systems was negative except for that written in the HPI. Vital Signs:    Last 24hrs VS reviewed since prior progress note. Most recent are:  Visit Vitals  BP (!) 161/91 (BP 1 Location: Left arm, BP Patient Position: At rest)   Pulse 74   Temp 97.9 °F (36.6 °C)   Resp 18   Ht 5' 7\" (1.702 m)   Wt 91.6 kg (202 lb)   SpO2 94%   BMI 31.64 kg/m²         Intake/Output Summary (Last 24 hours) at 1/16/2021 6147  Last data filed at 1/16/2021 0630  Gross per 24 hour   Intake 480 ml   Output    Net 480 ml        Physical Examination:             General:          Alert, cooperative, no distress, appears stated age. HEENT:           Atraumatic, anicteric sclerae, pink conjunctivae                          No oral ulcers, mucosa moist, throat clear, dentition fair  Neck:               Supple, symmetrical  Lungs:             Clear to auscultation bilaterally. No Wheezing or Rhonchi. No rales. Chest wall:      No tenderness  No Accessory muscle use. Heart:              Regular  rhythm,  No  murmur   No edema  Abdomen:        Soft, non-tender. Not distended. Bowel sounds normal  Extremities:     No cyanosis. No clubbing,                            Skin turgor normal, Capillary refill normal  Skin:                Not pale. Not Jaundiced  No rashes   Psych:             Not anxious or agitated.   Neurologic:      Alert, moves all extremities, answers questions appropriately and responds to commands        Data Review:    Review and/or order of clinical lab test  Review and/or order of tests in the medicine section of CPT      Labs:     Recent Labs     01/16/21  0425 01/15/21  0425   WBC 8.0 9.9   HGB 9.4* 9.8*   HCT 30.6* 32.3*    362 Recent Labs     01/16/21  0425 01/15/21  0425 01/14/21  0355    142 142   K 3.9 4.2 3.9    109 111   CO2 26 27 24   BUN 8 11 13   CREA 0.87 0.94 0.83   GLU 43* 55* 54*   CA 8.2* 8.3* 8.1*   MG 1.7 2.0 1.8   PHOS 4.1 4.4 3.9     Recent Labs     01/16/21  0425 01/15/21  0425 01/14/21  0355   ALB 2.2* 2.4* 2.1*     Recent Labs     01/16/21 0425 01/14/21  0355   APTT 40.1* 64.7*      No results for input(s): FE, TIBC, PSAT, FERR in the last 72 hours. No results found for: FOL, RBCF   No results for input(s): PH, PCO2, PO2 in the last 72 hours. No results for input(s): CPK, CKNDX, TROIQ in the last 72 hours.     No lab exists for component: CPKMB  No results found for: CHOL, CHOLX, CHLST, CHOLV, HDL, HDLP, LDL, LDLC, DLDLP, TGLX, TRIGL, TRIGP, CHHD, CHHDX  Lab Results   Component Value Date/Time    Glucose (POC) 104 01/16/2021 07:21 AM    Glucose (POC) 77 01/16/2021 06:59 AM    Glucose (POC) 61 (L) 01/16/2021 06:42 AM    Glucose (POC) 52 (LL) 01/16/2021 06:26 AM    Glucose (POC) 86 01/15/2021 09:41 PM     Lab Results   Component Value Date/Time    Color DARK YELLOW 01/10/2021 12:40 PM    Appearance CLOUDY 01/10/2021 12:40 PM    Specific gravity 1.018 01/10/2021 12:40 PM    pH (UA) 5.0 01/10/2021 12:40 PM    Protein 30 (A) 01/10/2021 12:40 PM    Glucose Negative 01/10/2021 12:40 PM    Ketone TRACE (A) 01/10/2021 12:40 PM    Bilirubin Negative 01/10/2021 12:40 PM    Urobilinogen 0.2 01/10/2021 12:40 PM    Nitrites Negative 01/10/2021 12:40 PM    Leukocyte Esterase Negative 01/10/2021 12:40 PM    Bacteria 1+ (A) 01/10/2021 12:40 PM    WBC 0 to 3 01/10/2021 12:40 PM    RBC 0 to 1 01/10/2021 12:40 PM         Medications Reviewed:     Current Facility-Administered Medications   Medication Dose Route Frequency    calcium carbonate (TUMS) chewable tablet 400 mg [elemental]  400 mg Oral Q6H PRN    alum-mag hydroxide-simeth (MYLANTA) oral suspension 30 mL  30 mL Oral Q4H PRN    lip treatment topical (CHAPSTICK)   Topical PRN    anidulafungin (ERAXIS) 100 mg in 0.9% sodium chloride 130 mL IVPB  100 mg IntraVENous Q24H    QUEtiapine (SEROquel) tablet 400 mg  400 mg Oral Q12H    enoxaparin (LOVENOX) injection 90 mg  90 mg SubCUTAneous Q12H    famotidine (PEPCID) tablet 20 mg  20 mg Oral BID    melatonin tablet 6 mg  6 mg Oral QHS PRN    oxyCODONE-acetaminophen (PERCOCET) 5-325 mg per tablet 1 Tab  1 Tab Oral Q6H PRN    nicotine (NICODERM CQ) 14 mg/24 hr patch 1 Patch  1 Patch TransDERmal DAILY    FLUoxetine (PROzac) capsule 80 mg  80 mg Oral DAILY    sodium chloride (NS) flush 5-40 mL  5-40 mL IntraVENous Q8H    sodium chloride (NS) flush 5-40 mL  5-40 mL IntraVENous PRN    acetaminophen (TYLENOL) tablet 650 mg  650 mg Oral Q6H PRN    Or    acetaminophen (TYLENOL) suppository 650 mg  650 mg Rectal Q6H PRN    polyethylene glycol (MIRALAX) packet 17 g  17 g Oral DAILY PRN    promethazine (PHENERGAN) tablet 12.5 mg  12.5 mg Oral Q6H PRN    Or    ondansetron (ZOFRAN) injection 4 mg  4 mg IntraVENous Q6H PRN    insulin lispro (HUMALOG) injection   SubCUTAneous AC&HS    glucose chewable tablet 16 g  4 Tab Oral PRN    glucagon (GLUCAGEN) injection 1 mg  1 mg IntraMUSCular PRN    dextrose (D50) infusion 12.5-25 g  25-50 mL IntraVENous PRN     ______________________________________________________________________  EXPECTED LENGTH OF STAY: 6d 18h  ACTUAL LENGTH OF STAY:          7                 Gisela Christopher MD

## 2021-01-16 NOTE — PROGRESS NOTES
0715- Bedside and Verbal shift change report given to DAYANA Mar (oncoming nurse) by Ludin Sofia RN (offgoing nurse). Report included the following information SBAR, Kardex, Intake/Output, MAR and Recent Results. 8984- AM meds given. 1140- Reassessment completed. No changes from previous assessment. 1424- Lovenox given. 1601- Antibiotic given. Pt resting in bed, no signs of distress. 1738- Pt refused PM med. Documented in [facility]    1925- Bedside and Verbal shift change report given to Ludin Sofia RN (oncoming nurse) by Jason Landry RN (offgoing nurse). Report included the following information SBAR, Kardex, Intake/Output, MAR and Recent Results.

## 2021-01-16 NOTE — PROGRESS NOTES
Bedside shift report received from QUENTIN Tristan 541: AOX4, on room air, resting in bed with regular, nonlabored breathing; no needs voiced at this time; shift assessment done    2017:  Up to the bathroom without assist    2200: resting in bed; HS snack given    2336: prn Percocet po given as requested for pain- see flowsheet    0020: sleeping; needs within reach    0230: sleeping; NSR on tele    0435: v/s done; no needs voiced    0618: critical blood glucose 43 per lab; repeated= 52; alert, conversant; apple juice given    0642: BS 61; apple juice given    0721: ; no complaints voiced    0725: Bedside and Verbal shift change report given to Val Otey RNa dn Edwyna Gosselin RN (oncoming nurse) by Luis Ingram RN (offgoing nurse). Report included the following information SBAR, Kardex, Intake/Output, Recent Results and Cardiac Rhythm NSR.

## 2021-01-17 LAB
ALBUMIN SERPL-MCNC: 2.2 G/DL (ref 3.4–5)
ANION GAP SERPL CALC-SCNC: 6 MMOL/L (ref 3–18)
APTT PPP: 32.2 SEC (ref 23–36.4)
BUN SERPL-MCNC: 7 MG/DL (ref 7–18)
BUN/CREAT SERPL: 9 (ref 12–20)
CALCIUM SERPL-MCNC: 8.3 MG/DL (ref 8.5–10.1)
CHLORIDE SERPL-SCNC: 105 MMOL/L (ref 100–111)
CO2 SERPL-SCNC: 29 MMOL/L (ref 21–32)
CREAT SERPL-MCNC: 0.78 MG/DL (ref 0.6–1.3)
GLUCOSE BLD STRIP.AUTO-MCNC: 102 MG/DL (ref 70–110)
GLUCOSE BLD STRIP.AUTO-MCNC: 139 MG/DL (ref 70–110)
GLUCOSE BLD STRIP.AUTO-MCNC: 72 MG/DL (ref 70–110)
GLUCOSE BLD STRIP.AUTO-MCNC: 78 MG/DL (ref 70–110)
GLUCOSE SERPL-MCNC: 62 MG/DL (ref 74–99)
PHOSPHATE SERPL-MCNC: 4.2 MG/DL (ref 2.5–4.9)
POTASSIUM SERPL-SCNC: 4.1 MMOL/L (ref 3.5–5.5)
SODIUM SERPL-SCNC: 140 MMOL/L (ref 136–145)

## 2021-01-17 PROCEDURE — 80069 RENAL FUNCTION PANEL: CPT

## 2021-01-17 PROCEDURE — 74011000258 HC RX REV CODE- 258: Performed by: INTERNAL MEDICINE

## 2021-01-17 PROCEDURE — 74011250637 HC RX REV CODE- 250/637: Performed by: INTERNAL MEDICINE

## 2021-01-17 PROCEDURE — 65660000000 HC RM CCU STEPDOWN

## 2021-01-17 PROCEDURE — 36415 COLL VENOUS BLD VENIPUNCTURE: CPT

## 2021-01-17 PROCEDURE — 74011250637 HC RX REV CODE- 250/637: Performed by: HOSPITALIST

## 2021-01-17 PROCEDURE — 74011250636 HC RX REV CODE- 250/636: Performed by: INTERNAL MEDICINE

## 2021-01-17 PROCEDURE — 2709999900 HC NON-CHARGEABLE SUPPLY

## 2021-01-17 PROCEDURE — 82962 GLUCOSE BLOOD TEST: CPT

## 2021-01-17 PROCEDURE — 85730 THROMBOPLASTIN TIME PARTIAL: CPT

## 2021-01-17 RX ADMIN — QUETIAPINE FUMARATE 400 MG: 200 TABLET ORAL at 23:04

## 2021-01-17 RX ADMIN — OXYCODONE AND ACETAMINOPHEN 1 TABLET: 5; 325 TABLET ORAL at 16:44

## 2021-01-17 RX ADMIN — OXYCODONE AND ACETAMINOPHEN 1 TABLET: 5; 325 TABLET ORAL at 10:12

## 2021-01-17 RX ADMIN — OXYCODONE AND ACETAMINOPHEN 1 TABLET: 5; 325 TABLET ORAL at 03:46

## 2021-01-17 RX ADMIN — QUETIAPINE FUMARATE 400 MG: 200 TABLET ORAL at 08:15

## 2021-01-17 RX ADMIN — ENOXAPARIN SODIUM 90 MG: 100 INJECTION SUBCUTANEOUS at 14:16

## 2021-01-17 RX ADMIN — OXYCODONE AND ACETAMINOPHEN 1 TABLET: 5; 325 TABLET ORAL at 23:10

## 2021-01-17 RX ADMIN — ENOXAPARIN SODIUM 90 MG: 100 INJECTION SUBCUTANEOUS at 03:50

## 2021-01-17 RX ADMIN — SODIUM CHLORIDE 10 ML: 9 INJECTION, SOLUTION INTRAMUSCULAR; INTRAVENOUS; SUBCUTANEOUS at 15:54

## 2021-01-17 RX ADMIN — FLUOXETINE 80 MG: 20 CAPSULE ORAL at 08:16

## 2021-01-17 RX ADMIN — SODIUM CHLORIDE 10 ML: 9 INJECTION, SOLUTION INTRAMUSCULAR; INTRAVENOUS; SUBCUTANEOUS at 23:06

## 2021-01-17 RX ADMIN — SODIUM CHLORIDE 100 MG: 9 INJECTION, SOLUTION INTRAVENOUS at 15:54

## 2021-01-17 RX ADMIN — SODIUM CHLORIDE 10 ML: 9 INJECTION, SOLUTION INTRAMUSCULAR; INTRAVENOUS; SUBCUTANEOUS at 05:31

## 2021-01-17 RX ADMIN — Medication 6 MG: at 23:10

## 2021-01-17 NOTE — PROGRESS NOTES
Bedside shift report received from Manny Han RN and Eileen Park RN    2024: AOX4, on room air, resting in bed watching sports on tv; denies any pain or other discomforts at this time; v/s checked; no needs voiced    2119: ; watching tv    2139: med given; prn Percocet given po as requested for pain- see flowsheet    2230: watching tv; no needs voiced    2338: v/s monitoring done; no needs voiced    0030: sleeping; no apparent distress noted    0230: sleeping; NSR on tele    0346: prn Percocet po given as requested for pain    0430: sleeping; no visual indicators for pain noted    0540: sleeping; NSR on tele    0700: resting in bed, no distress noted    Bedside and Verbal shift change report given to QUENTIN Munoz RN and Eileen Park RN (oncoming nurse) by Gregorio Rodriguez RN (offgoing nurse). Report included the following information SBAR, Kardex, Intake/Output, Recent Results and Cardiac Rhythm NSR.

## 2021-01-17 NOTE — PROGRESS NOTES
Hospitalist Progress Note             Date of Service:  2021  NAME:  Kelton Contreras  :  1965  MRN:  [id number]    Assessment & Plan:      Septic shock/septicemia- due to PICC line  - yeast growing in bld cult - started on anidulafungin- will need 2 weeks iv antifungal, starting , as long as blood cultures remain negative,   - downgrade abx to ceftriaxone  - wbc improved wo wnl  - ct scan leg w/o contrast reviewed, no acute osteo seen, no abcess  - sp levophed  - lactic acid has improved   - sp central line  - sp kline  - reviewed id not, yeast profile= CANDIDA DUBLINIENSIS GROWING IN THE AEROBIC BOTTLE, abx complete today, will need iv antifungal, PICC line ordered, when placed pt will be appropriate for dishcarge     Picc related upper extremity DVT- lue  - cont lovenoxkatialey dc on short course noac, especially since may have to dc with another picc     DM2  - cont ins ss  - hypoglycemia this am, dc all standing insulins, suspect that pt eating habits much better in hospital than pta, currently requiring no insulin     Psych- schizoaffective  - cont prozac, seroquel     Acute Kidney Injury  - resolved, sp ivf     Anemia  - suspect due to acute illness and infection  - hgb at 9.4, normocytic  - transfuse < 7        Code status: full  DVT prophylaxis:       Ok for dc tomorrow after PICC line placed, antifungals per ID      Hospital Problems  Never Reviewed          Codes Class Noted POA    PICC line infection ICD-10-CM: T80.219A  ICD-9-CM: 999.31  2021 Unknown        * (Principal) Septic shock ([facility] [address] ICD-10-CM: A41.9, R65.21  ICD-9-CM: 038.9, 785.52, 995.92  2021 Unknown        Type 2 diabetes mellitus (HCC) ICD-10-CM: E11.9  ICD-9-CM: 250.00  Unknown Unknown        Schizoaffective disorder, bipolar type (HCC) ICD-10-CM: F25.0  ICD-9-CM: 295.70  Unknown Unknown        Hyperlipidemia ICD-10-CM: E78.5  ICD-9-CM: 272.4  Unknown Unknown        Infected hardware in right leg ([address] ICD-10-CM: T84. 7XXA  ICD-9-CM: 996.67  Unknown Unknown        Lactic acidosis ICD-10-CM: E87.2  ICD-9-CM: 276.2  Unknown Unknown        Acute renal failure (ARF) (HCC) ICD-10-CM: N17.9  ICD-9-CM: 584.9  1/9/2021 Unknown        Hypomagnesemia ICD-10-CM: M94.07  ICD-9-CM: 275.2  1/9/2021 Unknown                Review of Systems:   A comprehensive review of systems was negative except for that written in the HPI. Vital Signs:    Last 24hrs VS reviewed since prior progress note. Most recent are:  Visit Vitals  BP (!) 164/89 (BP 1 Location: Right arm, BP Patient Position: At rest)   Pulse 86   Temp 98.3 °F (36.8 °C)   Resp 18   Ht 5' 7\" (1.702 m)   Wt 91.6 kg (202 lb)   SpO2 95%   BMI 31.64 kg/m²         Intake/Output Summary (Last 24 hours) at 1/17/2021 1311  Last data filed at 1/17/2021 0930  Gross per 24 hour   Intake 720 ml   Output    Net 720 ml        Physical Examination:             General:          Alert, cooperative, no distress, appears stated age. HEENT:           Atraumatic, anicteric sclerae, pink conjunctivae                          No oral ulcers, mucosa moist, throat clear, dentition fair  Neck:               Supple, symmetrical  Lungs:             Clear to auscultation bilaterally. No Wheezing or Rhonchi. No rales. Chest wall:      No tenderness  No Accessory muscle use. Heart:              Regular  rhythm,  No  murmur   No edema  Abdomen:        Soft, non-tender. Not distended. Bowel sounds normal  Extremities:     No cyanosis. No clubbing,                            Skin turgor normal, Capillary refill normal  Skin:                Not pale. Not Jaundiced  No rashes   Psych:             Not anxious or agitated.   Neurologic:      Alert, moves all extremities, answers questions appropriately and responds to commands        Data Review:    Review and/or order of clinical lab test  Review and/or order of tests in the radiology section of CPT  Review and/or order of tests in the medicine section of CPT      Labs:     Recent Labs     01/16/21  0425 01/15/21  0425   WBC 8.0 9.9   HGB 9.4* 9.8*   HCT 30.6* 32.3*    362     Recent Labs     01/17/21  0335 01/16/21  0425 01/15/21  0425    142 142   K 4.1 3.9 4.2    108 109   CO2 29 26 27   BUN 7 8 11   CREA 0.78 0.87 0.94   GLU 62* 43* 55*   CA 8.3* 8.2* 8.3*   MG  --  1.7 2.0   PHOS 4.2 4.1 4.4     Recent Labs     01/17/21  0335 01/16/21  0425 01/15/21  0425   ALB 2.2* 2.2* 2.4*     Recent Labs     01/17/21 0335 01/16/21 0425   APTT 32.2 40.1*      No results for input(s): FE, TIBC, PSAT, FERR in the last 72 hours. No results found for: FOL, RBCF   No results for input(s): PH, PCO2, PO2 in the last 72 hours. No results for input(s): CPK, CKNDX, TROIQ in the last 72 hours.     No lab exists for component: CPKMB  No results found for: CHOL, CHOLX, CHLST, CHOLV, HDL, HDLP, LDL, LDLC, DLDLP, TGLX, TRIGL, TRIGP, CHHD, CHHDX  Lab Results   Component Value Date/Time    Glucose (POC) 78 01/17/2021 11:27 AM    Glucose (POC) 72 01/17/2021 07:36 AM    Glucose (POC) 130 (H) 01/16/2021 09:19 PM    Glucose (POC) 86 01/16/2021 04:09 PM    Glucose (POC) 97 01/16/2021 11:34 AM     Lab Results   Component Value Date/Time    Color DARK YELLOW 01/10/2021 12:40 PM    Appearance CLOUDY 01/10/2021 12:40 PM    Specific gravity 1.018 01/10/2021 12:40 PM    pH (UA) 5.0 01/10/2021 12:40 PM    Protein 30 (A) 01/10/2021 12:40 PM    Glucose Negative 01/10/2021 12:40 PM    Ketone TRACE (A) 01/10/2021 12:40 PM    Bilirubin Negative 01/10/2021 12:40 PM    Urobilinogen 0.2 01/10/2021 12:40 PM    Nitrites Negative 01/10/2021 12:40 PM    Leukocyte Esterase Negative 01/10/2021 12:40 PM    Bacteria 1+ (A) 01/10/2021 12:40 PM    WBC 0 to 3 01/10/2021 12:40 PM    RBC 0 to 1 01/10/2021 12:40 PM         Medications Reviewed:     Current Facility-Administered Medications   Medication Dose Route Frequency    calcium carbonate (TUMS) chewable tablet 400 mg [elemental]  400 mg Oral Q6H PRN    alum-mag hydroxide-simeth (MYLANTA) oral suspension 30 mL  30 mL Oral Q4H PRN    lip treatment topical (CHAPSTICK)   Topical PRN    anidulafungin (ERAXIS) 100 mg in 0.9% sodium chloride 130 mL IVPB  100 mg IntraVENous Q24H    QUEtiapine (SEROquel) tablet 400 mg  400 mg Oral Q12H    enoxaparin (LOVENOX) injection 90 mg  90 mg SubCUTAneous Q12H    famotidine (PEPCID) tablet 20 mg  20 mg Oral BID    melatonin tablet 6 mg  6 mg Oral QHS PRN    oxyCODONE-acetaminophen (PERCOCET) 5-325 mg per tablet 1 Tab  1 Tab Oral Q6H PRN    nicotine (NICODERM CQ) 14 mg/24 hr patch 1 Patch  1 Patch TransDERmal DAILY    FLUoxetine (PROzac) capsule 80 mg  80 mg Oral DAILY    sodium chloride (NS) flush 5-40 mL  5-40 mL IntraVENous Q8H    sodium chloride (NS) flush 5-40 mL  5-40 mL IntraVENous PRN    acetaminophen (TYLENOL) tablet 650 mg  650 mg Oral Q6H PRN    Or    acetaminophen (TYLENOL) suppository 650 mg  650 mg Rectal Q6H PRN    polyethylene glycol (MIRALAX) packet 17 g  17 g Oral DAILY PRN    promethazine (PHENERGAN) tablet 12.5 mg  12.5 mg Oral Q6H PRN    Or    ondansetron (ZOFRAN) injection 4 mg  4 mg IntraVENous Q6H PRN    insulin lispro (HUMALOG) injection   SubCUTAneous AC&HS    glucose chewable tablet 16 g  4 Tab Oral PRN    glucagon (GLUCAGEN) injection 1 mg  1 mg IntraMUSCular PRN    dextrose (D50) infusion 12.5-25 g  25-50 mL IntraVENous PRN     ______________________________________________________________________  EXPECTED LENGTH OF STAY: 6d 18h  ACTUAL LENGTH OF STAY:          8                 Viv Prakash MD

## 2021-01-17 NOTE — PROGRESS NOTES
Problem: Diabetes Self-Management  Goal: *Disease process and treatment process  Description: Define diabetes and identify own type of diabetes; list 3 options for treating diabetes. Outcome: Progressing Towards Goal  Goal: *Incorporating nutritional management into lifestyle  Description: Describe effect of type, amount and timing of food on blood glucose; list 3 methods for planning meals. Outcome: Progressing Towards Goal  Goal: *Incorporating physical activity into lifestyle  Description: State effect of exercise on blood glucose levels. Outcome: Progressing Towards Goal  Goal: *Developing strategies to promote health/change behavior  Description: Define the ABC's of diabetes; identify appropriate screenings, schedule and personal plan for screenings. Outcome: Progressing Towards Goal  Goal: *Using medications safely  Description: State effect of diabetes medications on diabetes; name diabetes medication taking, action and side effects. Outcome: Progressing Towards Goal  Goal: *Monitoring blood glucose, interpreting and using results  Description: Identify recommended blood glucose targets  and personal targets. Outcome: Progressing Towards Goal  Goal: *Prevention, detection, treatment of acute complications  Description: List symptoms of hyper- and hypoglycemia; describe how to treat low blood sugar and actions for lowering  high blood glucose level. Outcome: Progressing Towards Goal  Goal: *Prevention, detection and treatment of chronic complications  Description: Define the natural course of diabetes and describe the relationship of blood glucose levels to long term complications of diabetes.   Outcome: Progressing Towards Goal  Goal: *Developing strategies to address psychosocial issues  Description: Describe feelings about living with diabetes; identify support needed and support network  Outcome: Progressing Towards Goal  Goal: *Insulin pump training  Outcome: Progressing Towards Goal  Goal: *Sick day guidelines  Outcome: Progressing Towards Goal  Goal: *Patient Specific Goal (EDIT GOAL, INSERT TEXT)  Outcome: Progressing Towards Goal     Problem: Falls - Risk of  Goal: *Absence of Falls  Description: Document Kemal Fall Risk and appropriate interventions in the flowsheet. Outcome: Progressing Towards Goal  Note: Fall Risk Interventions:  Mobility Interventions: Patient to call before getting OOB         Medication Interventions: Teach patient to arise slowly    Elimination Interventions: Call light in reach, Urinal in reach              Problem: Pain  Goal: *Control of Pain  Outcome: Progressing Towards Goal  Goal: *PALLIATIVE CARE:  Alleviation of Pain  Outcome: Progressing Towards Goal     Problem: Pressure Injury - Risk of  Goal: *Prevention of pressure injury  Description: Document Erickson Scale and appropriate interventions in the flowsheet. Outcome: Progressing Towards Goal  Note: Pressure Injury Interventions:             Activity Interventions: Increase time out of bed, Pressure redistribution bed/mattress(bed type)    Mobility Interventions: HOB 30 degrees or less, Pressure redistribution bed/mattress (bed type)    Nutrition Interventions: Document food/fluid/supplement intake, Offer support with meals,snacks and hydration                     Problem: Patient Education: Go to Patient Education Activity  Goal: Patient/Family Education  Outcome: Progressing Towards Goal

## 2021-01-18 ENCOUNTER — HOSPITAL ENCOUNTER (INPATIENT)
Dept: CARDIAC CATH/INVASIVE PROCEDURES | Age: 56
Discharge: HOME | DRG: 721 | End: 2021-01-18
Attending: INTERNAL MEDICINE
Payer: MEDICAID

## 2021-01-18 LAB
ALBUMIN SERPL-MCNC: 2.3 G/DL (ref 3.4–5)
ANION GAP SERPL CALC-SCNC: 7 MMOL/L (ref 3–18)
BUN SERPL-MCNC: 8 MG/DL (ref 7–18)
BUN/CREAT SERPL: 10 (ref 12–20)
CALCIUM SERPL-MCNC: 8.9 MG/DL (ref 8.5–10.1)
CHLORIDE SERPL-SCNC: 103 MMOL/L (ref 100–111)
CO2 SERPL-SCNC: 27 MMOL/L (ref 21–32)
CREAT SERPL-MCNC: 0.79 MG/DL (ref 0.6–1.3)
GLUCOSE BLD STRIP.AUTO-MCNC: 108 MG/DL (ref 70–110)
GLUCOSE BLD STRIP.AUTO-MCNC: 75 MG/DL (ref 70–110)
GLUCOSE BLD STRIP.AUTO-MCNC: 82 MG/DL (ref 70–110)
GLUCOSE BLD STRIP.AUTO-MCNC: 86 MG/DL (ref 70–110)
GLUCOSE SERPL-MCNC: 72 MG/DL (ref 74–99)
PHOSPHATE SERPL-MCNC: 4 MG/DL (ref 2.5–4.9)
POTASSIUM SERPL-SCNC: 4 MMOL/L (ref 3.5–5.5)
SODIUM SERPL-SCNC: 137 MMOL/L (ref 136–145)

## 2021-01-18 PROCEDURE — 74011250637 HC RX REV CODE- 250/637: Performed by: HOSPITALIST

## 2021-01-18 PROCEDURE — 74011000258 HC RX REV CODE- 258: Performed by: INTERNAL MEDICINE

## 2021-01-18 PROCEDURE — 36415 COLL VENOUS BLD VENIPUNCTURE: CPT

## 2021-01-18 PROCEDURE — 74011250637 HC RX REV CODE- 250/637: Performed by: INTERNAL MEDICINE

## 2021-01-18 PROCEDURE — 36573 INSJ PICC RS&I 5 YR+: CPT

## 2021-01-18 PROCEDURE — C1751 CATH, INF, PER/CENT/MIDLINE: HCPCS

## 2021-01-18 PROCEDURE — 82962 GLUCOSE BLOOD TEST: CPT

## 2021-01-18 PROCEDURE — 74011250636 HC RX REV CODE- 250/636: Performed by: INTERNAL MEDICINE

## 2021-01-18 PROCEDURE — 80069 RENAL FUNCTION PANEL: CPT

## 2021-01-18 PROCEDURE — 02HV33Z INSERTION OF INFUSION DEVICE INTO SUPERIOR VENA CAVA, PERCUTANEOUS APPROACH: ICD-10-PCS | Performed by: STUDENT IN AN ORGANIZED HEALTH CARE EDUCATION/TRAINING PROGRAM

## 2021-01-18 PROCEDURE — 65660000000 HC RM CCU STEPDOWN

## 2021-01-18 RX ADMIN — FLUOXETINE 80 MG: 20 CAPSULE ORAL at 08:48

## 2021-01-18 RX ADMIN — SODIUM CHLORIDE 100 MG: 9 INJECTION, SOLUTION INTRAVENOUS at 17:40

## 2021-01-18 RX ADMIN — ENOXAPARIN SODIUM 90 MG: 100 INJECTION SUBCUTANEOUS at 16:26

## 2021-01-18 RX ADMIN — QUETIAPINE FUMARATE 400 MG: 200 TABLET ORAL at 08:48

## 2021-01-18 RX ADMIN — SODIUM CHLORIDE 10 ML: 9 INJECTION, SOLUTION INTRAMUSCULAR; INTRAVENOUS; SUBCUTANEOUS at 05:12

## 2021-01-18 RX ADMIN — OXYCODONE AND ACETAMINOPHEN 1 TABLET: 5; 325 TABLET ORAL at 08:48

## 2021-01-18 RX ADMIN — OXYCODONE AND ACETAMINOPHEN 1 TABLET: 5; 325 TABLET ORAL at 16:27

## 2021-01-18 RX ADMIN — QUETIAPINE FUMARATE 400 MG: 200 TABLET ORAL at 23:13

## 2021-01-18 RX ADMIN — SODIUM CHLORIDE 10 ML: 9 INJECTION, SOLUTION INTRAMUSCULAR; INTRAVENOUS; SUBCUTANEOUS at 23:13

## 2021-01-18 RX ADMIN — FAMOTIDINE 20 MG: 20 TABLET ORAL at 17:17

## 2021-01-18 RX ADMIN — ENOXAPARIN SODIUM 90 MG: 100 INJECTION SUBCUTANEOUS at 01:10

## 2021-01-18 RX ADMIN — FAMOTIDINE 20 MG: 20 TABLET ORAL at 08:48

## 2021-01-18 NOTE — PROGRESS NOTES
Progress Note      Patient: Ian Reyna               Sex: male          DOA: 1/9/2021       YOB: 1965      Age:  54 y.o.        LOS:  LOS: 9 days             CHIEF COMPLAINT:  Sepsis    Subjective:     Patient feels okay  No distress    Objective:      Visit Vitals  /79 (BP 1 Location: Right arm, BP Patient Position: At rest)   Pulse 96   Temp 98.1 °F (36.7 °C)   Resp 18   Ht 5' 7\" (1.702 m)   Wt 91.6 kg (202 lb)   SpO2 93%   BMI 31.64 kg/m²       Physical Exam:  Gen:  No distress, no complaint  Lungs:  Clear bilaterally, no wheeze or rhonchi  Heart:  Regular rate and rhythm, no murmurs or gallops  Abdomen:  Soft, non-tender, normal bowel sounds        Lab/Data Reviewed:  BMP:   Lab Results   Component Value Date/Time     01/18/2021 04:45 AM    K 4.0 01/18/2021 04:45 AM     01/18/2021 04:45 AM    CO2 27 01/18/2021 04:45 AM    AGAP 7 01/18/2021 04:45 AM    GLU 72 (L) 01/18/2021 04:45 AM    BUN 8 01/18/2021 04:45 AM    CREA 0.79 01/18/2021 04:45 AM    GFRAA >60 01/18/2021 04:45 AM    GFRNA >60 01/18/2021 04:45 AM         Assessment/Plan     Principal Problem:    Septic shock ([address] (1/9/2021)    Active Problems:    PICC line infection (1/9/2021)      Type 2 diabetes mellitus (HCC) ()      Schizoaffective disorder, bipolar type ([address] ()      Hyperlipidemia ()      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

## 2021-01-18 NOTE — PROGRESS NOTES
5674- Bedside and Verbal shift change report given to Xochitl Angulo RN (oncoming nurse) by DAYANA Bhatti (offgoing nurse). Report included the following information SBAR, Kardex, Intake/Output, MAR and Recent Results. 9896- AM meds given. Pt resting in bed, no signs of distress. A&O4. 1130- Insulin held, Pt BS within normal limits. 1416- Lovenox given. 1554- Antibiotic hung. 1945- Bedside and Verbal shift change report given to Xochitl Angulo RN  (oncoming nurse) by DAYANA Bhatti (offgoing nurse). Report included the following information SBAR, Kardex, Intake/Output, MAR and Recent Results.

## 2021-01-18 NOTE — PROCEDURES
Interventional Radiology Brief Procedure Note    Interventional Radiologist: Laura Bruce MD    Pre-operative Diagnosis: Needs long term IV access    Post-operative Diagnosis: Same as pre-operative diagnosis    Procedure(s) Performed:  PICC line placment    Anesthesia:  Local    Findings:  Informed consent. Ultrasound used to demonstrate patent upper extremity vein and to assist with veinous access. Single lumen 5 Fr PICC line placement over a wire and positioned with fluoroscopic guidance. PICC line is ready to use. Please see final dictated report for full details.     Complications: No immediate    Estimated Blood Loss:  Minimal    Specimens: None    Laura Bruce MD  [facility]  Radiology  1/18/2021
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
## 2021-01-18 NOTE — PROGRESS NOTES
The patient is an appropriate candidate to undergo imaging guided PICC placement. Patient assessed immediately prior to induction. Anesthesia plan as follows: Local Anesthesia. Planned agent(s):  Local lidocaine    ASA Score:  ASA 2 - Mild systemic disease    History and Physical update:  H&P was reviewed and the patient was examined. No changes have occurred in the patient's condition since the H&P was completed.     Cinthya Browne MD  Vascular & Interventional Radiology  [facility]  1/18/2021

## 2021-01-18 NOTE — PROGRESS NOTES
1139 -- Spoke with Ofelia Spears, informed that the PICC was ordered incorrectly and now she has 7 cases and wont's be able to insert a PICC on this patient today. Instructed on how to order PICC correctly and oncall PICC nurse will come in on tomorrow between 12-4    1157 -- Perfect served Dr. Edwin Llamas to get the PICC ordered correctly. 1244 -- Spoke with Rajani Jones had a cancellation will insert PICC this afternoon. 1415 -- Patient off the unit to Cath lab.    1601 -- Patient back on the unit. 1625 -- BS 86, patient encouraged to eat. Percocet given. 1740 -- Med given. Bedside shift change report given to Sweta Dillard RN (oncoming nurse) by Maxwell Khan RN (offgoing nurse). Report included the following information SBAR, Kardex, Intake/Output, MAR and Recent Results.

## 2021-01-18 NOTE — DIABETES MGMT
Nutrition Re-Assessment an Glycemic Control Plan of Care    Type and Reason for Visit: Reassess  Nutrition Recommendations/Plan:  1. No Added Sugar [place] Instant Breakfast BID  2. 1800 calorie consistent CHO diet  3. Corrective lispro only for glycemic control (due to hypoglycemia with Lantus)  Nutrition Assessment:  Pt with BMI - 31.6 kg/m2 (class 1 obesity). Pt appears well nourished. Poor po intake noted at admission but appetite appears to be overall improving and he states he is consuming po supplements. Variable weights per chart review (7 lb weight loss since 1/11/21). Malnutrition Assessment:  Malnutrition Status:  No malnutrition    Diabetes Management:   Pt reports he has a meter at home and checks his BG TID. Pt was pleased with current A1C and suspects it may be partly due to more restricted eating at [facility]. Noted AM hypoglycemia prior 4 mornings with Lantus dose decreased and finially discontinued 1/16/20. Pt is still receiving corrective lispro but has not required any since 1/11/21. Pt with considerably lower insulin needs than PTA.     Recent blood glucose:   1/18:  Lab - 72  POC - 75, 82  1/17:  Lab - 62  POC_75 to 139  1/16:  Lab - 43  POC - 52, 104, 97, 86, 130   1/15:  Lab -  53  POC - 74, 62, 98, 79, 76, 86  1/14:  Lab -  54  POC - 65, 86, 123, 126, 95., 84   Within target range (non-ICU: <140; ICU<180):  variable  Current Insulin regimen:   Corrective lispro, normal insulin sensitivity ACHS  Home medication/insulin regimen: pt confirmed:  45 units Lantus BID  6 units lispro at breakfast, lunch, dinner when BG > 200 (pt reports he does not receive this at [facility] but receives sliding scale at when BG > 150 mg/dL)  HbA1c:7.9%  equivalent  to ave Blood Glucose of  177  mg/dl for 2-3 months prior to admission   Adequate glycemic control PTA:   No but pt reports it has improved  Estimated Daily Nutrient Needs:  Energy (kcal): 2089    Protein (g): 87    Fluid (ml/day): 2100 Nutrition Related Findings:  Pt with PMHx of T2DM, HTN, recent removal of infected RLE hardware admitted to [facility] 1/9/21 for fever and  left arm midline infection, PICC related upper extremity DVT. Pt reports he is eating fine and likes his po supplements. Wounds:    Surgical incision     Current Nutrition Therapies:  DIET NUTRITIONAL SUPPLEMENTS ([facility]) Dinner, Breakfast; Instant Breakfast SF  DIET DIABETIC WITH OPTIONS Consistent Carb 1800kcal; Regular  Meal intake:   Patient Vitals for the past 168 hrs:   % Diet Eaten   01/18/21 0930 75 %   01/17/21 1418 60 %   01/17/21 0930 40 %   01/16/21 1738 80 %   01/16/21 1300 75 %   01/16/21 0801 80 %   01/13/21 1249 0 %   01/13/21 0840 50 %     Anthropometric Measures:  · Height:  5' 7\" (170.2 cm)  · Current Body Wt:  91.6 kg (201 lb 15.1 oz)(1/16/21)   · Admission Body Wt:  208 lb 15.9 oz(1/11/21)    · Usual Body Wt:  90.7 kg (200 lb)      · BMI Category:  Obese class 1 (BMI 30.0-34. 9)       Last 3 Recorded Weights in this Encounter    01/13/21 0710 01/15/21 0433 01/16/21 0435   Weight: 92.1 kg (203 lb) 93 kg (205 lb) 91.6 kg (202 lb)     Nutrition Diagnosis:   · Inadequate energy intake related to early satiety as evidenced by intake 0-25%  · Altered nutrition-related lab values related to endocrine dysfunction as evidenced by lab values(A1C - 7.9%)  · Overweight/obesity related to excessive energy intake as evidenced by BMI(32.6 kg/m2)  Nutrition Interventions:   Food and/or Nutrient Delivery: Continue current diet, Continue oral nutrition supplement  Nutrition Education and Counseling: No recommendations at this time  Coordination of Nutrition Care: Continue to monitor while inpatient  Goals:  Blood glucose will be in target range (70 to 180 mg/dL) within 3 days. PO intake will meet >75% of patient estimated nutritional needs within the next 7 days.      Nutrition Monitoring and Evaluation:   Behavioral-Environmental Outcomes: None identified  Food/Nutrient Intake Outcomes: Food and nutrient intake, Supplement intake  Physical Signs/Symptoms Outcomes: Biochemical data, Meal time behavior, Weight  Discharge Planning:    No discharge needs at this time   Electronically signed by Marcos Leyva RD on 1/18/2021 at 2:19 PM  Contact:  Marcos Leyva, [address], CDE   Office:  [address] [place] [address] Pager:  [phone]

## 2021-01-19 LAB
ALBUMIN SERPL-MCNC: 2.6 G/DL (ref 3.4–5)
ANION GAP SERPL CALC-SCNC: 6 MMOL/L (ref 3–18)
BACTERIA SPEC CULT: NORMAL
BACTERIA SPEC CULT: NORMAL
BUN SERPL-MCNC: 10 MG/DL (ref 7–18)
BUN/CREAT SERPL: 13 (ref 12–20)
CALCIUM SERPL-MCNC: 8.9 MG/DL (ref 8.5–10.1)
CHLORIDE SERPL-SCNC: 101 MMOL/L (ref 100–111)
CO2 SERPL-SCNC: 28 MMOL/L (ref 21–32)
CREAT SERPL-MCNC: 0.77 MG/DL (ref 0.6–1.3)
GLUCOSE BLD STRIP.AUTO-MCNC: 109 MG/DL (ref 70–110)
GLUCOSE BLD STRIP.AUTO-MCNC: 127 MG/DL (ref 70–110)
GLUCOSE BLD STRIP.AUTO-MCNC: 147 MG/DL (ref 70–110)
GLUCOSE BLD STRIP.AUTO-MCNC: 92 MG/DL (ref 70–110)
GLUCOSE SERPL-MCNC: 86 MG/DL (ref 74–99)
PHOSPHATE SERPL-MCNC: 4 MG/DL (ref 2.5–4.9)
POTASSIUM SERPL-SCNC: 4.4 MMOL/L (ref 3.5–5.5)
SERVICE CMNT-IMP: NORMAL
SERVICE CMNT-IMP: NORMAL
SODIUM SERPL-SCNC: 135 MMOL/L (ref 136–145)

## 2021-01-19 PROCEDURE — 74011250637 HC RX REV CODE- 250/637: Performed by: HOSPITALIST

## 2021-01-19 PROCEDURE — 74011250637 HC RX REV CODE- 250/637: Performed by: INTERNAL MEDICINE

## 2021-01-19 PROCEDURE — 36415 COLL VENOUS BLD VENIPUNCTURE: CPT

## 2021-01-19 PROCEDURE — 80069 RENAL FUNCTION PANEL: CPT

## 2021-01-19 PROCEDURE — 74011000258 HC RX REV CODE- 258: Performed by: INTERNAL MEDICINE

## 2021-01-19 PROCEDURE — 65660000000 HC RM CCU STEPDOWN

## 2021-01-19 PROCEDURE — 82962 GLUCOSE BLOOD TEST: CPT

## 2021-01-19 PROCEDURE — 74011250636 HC RX REV CODE- 250/636: Performed by: INTERNAL MEDICINE

## 2021-01-19 RX ADMIN — OXYCODONE AND ACETAMINOPHEN 1 TABLET: 5; 325 TABLET ORAL at 09:16

## 2021-01-19 RX ADMIN — SODIUM CHLORIDE 10 ML: 9 INJECTION, SOLUTION INTRAMUSCULAR; INTRAVENOUS; SUBCUTANEOUS at 14:04

## 2021-01-19 RX ADMIN — SODIUM CHLORIDE 100 MG: 9 INJECTION, SOLUTION INTRAVENOUS at 15:55

## 2021-01-19 RX ADMIN — SODIUM CHLORIDE 10 ML: 9 INJECTION, SOLUTION INTRAMUSCULAR; INTRAVENOUS; SUBCUTANEOUS at 22:00

## 2021-01-19 RX ADMIN — ENOXAPARIN SODIUM 90 MG: 100 INJECTION SUBCUTANEOUS at 14:04

## 2021-01-19 RX ADMIN — QUETIAPINE FUMARATE 400 MG: 200 TABLET ORAL at 20:51

## 2021-01-19 RX ADMIN — SODIUM CHLORIDE 10 ML: 9 INJECTION, SOLUTION INTRAMUSCULAR; INTRAVENOUS; SUBCUTANEOUS at 20:51

## 2021-01-19 RX ADMIN — OXYCODONE AND ACETAMINOPHEN 1 TABLET: 5; 325 TABLET ORAL at 16:05

## 2021-01-19 RX ADMIN — ENOXAPARIN SODIUM 90 MG: 100 INJECTION SUBCUTANEOUS at 01:01

## 2021-01-19 RX ADMIN — FLUOXETINE 80 MG: 20 CAPSULE ORAL at 09:13

## 2021-01-19 RX ADMIN — SODIUM CHLORIDE 10 ML: 9 INJECTION, SOLUTION INTRAMUSCULAR; INTRAVENOUS; SUBCUTANEOUS at 06:08

## 2021-01-19 RX ADMIN — QUETIAPINE FUMARATE 400 MG: 200 TABLET ORAL at 09:13

## 2021-01-19 RX ADMIN — OXYCODONE AND ACETAMINOPHEN 1 TABLET: 5; 325 TABLET ORAL at 20:51

## 2021-01-19 RX ADMIN — FAMOTIDINE 20 MG: 20 TABLET ORAL at 09:13

## 2021-01-19 RX ADMIN — FAMOTIDINE 20 MG: 20 TABLET ORAL at 17:53

## 2021-01-19 NOTE — PROGRESS NOTES
Quoc Eaton from [facility] called me , states they cannot accept pt today they have 7 admits and were not expecting pt . Notified dr Tiana Hatfield. [facility] with [facility]. Transport set for tomorrow 1pm.   Notified pt, and nurses haleigh and yesy.

## 2021-01-19 NOTE — ROUTINE PROCESS
Bedside and Verbal shift change report given to Latrice Keller RN, BSN (oncoming nurse) by Alejandra Perez RN (offgoing nurse). Report included the following information SBAR, Kardex, ED Summary, Procedure Summary, Intake/Output, MAR and Recent Results.   
 
Latrice Keller RN, BSN

## 2021-01-19 NOTE — DISCHARGE INSTRUCTIONS
Your A1C  was   Lab Results   Component Value Date/Time    Hemoglobin A1c 7.9 (H) 01/09/2021 10:45 AM     This lab test reflects that your blood sugar was elevated  over the past 3 months and should be evaluated by your primary care provider. This lab test reflects that your blood sugar averaged 177 mg/dL over the past 3 months. It is important to follow up with your provider on a routine basis to continue to evaluate your blood sugar discuss any necessary changes in treatment. DISCHARGE SUMMARY from Nurse    PATIENT INSTRUCTIONS:    After general anesthesia or intravenous sedation, for 24 hours or while taking prescription Narcotics:  · Limit your activities  · Do not drive and operate hazardous machinery  · Do not make important personal or business decisions  · Do  not drink alcoholic beverages  · If you have not urinated within 8 hours after discharge, please contact your surgeon on call. Report the following to your surgeon:  · Excessive pain, swelling, redness or odor of or around the surgical area  · Temperature over 100.5  · Nausea and vomiting lasting longer than 4 hours or if unable to take medications  · Any signs of decreased circulation or nerve impairment to extremity: change in color, persistent  numbness, tingling, coldness or increase pain  · Any questions    What to do at Home:  Recommended activity: Activity as tolerated    If you experience any of the following symptoms fever, chills or uncontrolled pain, please follow up with rehab staff/emergency room. *  Please give a list of your current medications to your Primary Care Provider. *  Please update this list whenever your medications are discontinued, doses are      changed, or new medications (including over-the-counter products) are added. *  Please carry medication information at all times in case of emergency situations.     These are general instructions for a healthy lifestyle:    No smoking/ No tobacco products/ Avoid exposure to second hand smoke  Surgeon General's Warning:  Quitting smoking now greatly reduces serious risk to your health. Obesity, smoking, and sedentary lifestyle greatly increases your risk for illness    A healthy diet, regular physical exercise & weight monitoring are important for maintaining a healthy lifestyle    You may be retaining fluid if you have a history of heart failure or if you experience any of the following symptoms:  Weight gain of 3 pounds or more overnight or 5 pounds in a week, increased swelling in our hands or feet or shortness of breath while lying flat in bed. Please call your doctor as soon as you notice any of these symptoms; do not wait until your next office visit. The discharge information has been reviewed with the patient. The patient verbalized understanding. Discharge medications reviewed with the patient and appropriate educational materials and side effects teaching were provided. Patient discharged without removing armband and transfered to another healthcare acute, sub acute , or extended care facility. Informed of privacy risks if armband lost or stolen.

## 2021-01-19 NOTE — PROGRESS NOTES
1900  -- Bedside, Verbal and Written shift change report given to [address] (oncoming nurse) by [facility] nurse). Report included the following information SBAR, Kardex, Intake/Output, MAR and Recent Results.       0603 -- PM medications administered, pt tolerated with ease, will continue to monitor.       -- Bedside, Verbal and Written shift change report given to   (oncoming nurse) by RONDA (offgoing nurse). Report included the following information SBAR, Kardex, Intake/Output, MAR and Recent Results. Skin assessment completed.

## 2021-01-19 NOTE — PROGRESS NOTES
Problem: Discharge Planning  Goal: *Discharge to safe environment  Outcome: resolved/met   Plan return to [facility] Dr Manjeet Wolff, ok for pt to return to [facility] today. Transport set for 1pm with [facility], [id number], spoke with jael. Ref# for trip, [id number]  [facility]. [facility]'d [facility],rec confirmation. Pcs,envelope in nurses station. Notified pt, rosa m at [facility], Dr Manjeet Wolff, nurses haleigh and yesy. Care Management Interventions  PCP Verified by CM: No  Palliative Care Criteria Met (RRAT>21 & CHF Dx)?: No  Mode of Transport at Discharge:  Other (see comment)  Transition of Care Consult (CM Consult): SNF  Partner SNF: Yes  Discharge Durable Medical Equipment: No  Physical Therapy Consult: No  Occupational Therapy Consult: No  Speech Therapy Consult: No  Current Support Network: Nursing Facility  Confirm Follow Up Transport: Other (see comment)  The Patient and/or Patient Representative was Provided with a Choice of Provider and Agrees with the Discharge Plan?: No  Freedom of Choice List was Provided with Basic Dialogue that Supports the Patient's Individualized Plan of Care/Goals, Treatment Preferences and Shares the Quality Data Associated with the Providers?: No  Discharge Location  Discharge Placement: Skilled nursing facility

## 2021-01-19 NOTE — PROGRESS NOTES
[facility] will need new trip number for transport tomorrow. Spoke with yamile at [facility] to notifiy of cancelled trip today. Want to reschedule with [facility] tomorrow at 1pm. He states he will call [facility], they do not need new trip number. Spoke with parish at [facility], he looked up trip and stated it is fine, pt sched for 1pm tomorrow.

## 2021-01-19 NOTE — PROGRESS NOTES
Problem: Diabetes Self-Management  Goal: *Disease process and treatment process  Description: Define diabetes and identify own type of diabetes; list 3 options for treating diabetes. Outcome: Progressing Towards Goal  Goal: *Incorporating nutritional management into lifestyle  Description: Describe effect of type, amount and timing of food on blood glucose; list 3 methods for planning meals. Outcome: Progressing Towards Goal  Goal: *Incorporating physical activity into lifestyle  Description: State effect of exercise on blood glucose levels. Outcome: Progressing Towards Goal  Goal: *Developing strategies to promote health/change behavior  Description: Define the ABC's of diabetes; identify appropriate screenings, schedule and personal plan for screenings. Outcome: Progressing Towards Goal  Goal: *Using medications safely  Description: State effect of diabetes medications on diabetes; name diabetes medication taking, action and side effects. Outcome: Progressing Towards Goal  Goal: *Monitoring blood glucose, interpreting and using results  Description: Identify recommended blood glucose targets  and personal targets. Outcome: Progressing Towards Goal  Goal: *Prevention, detection, treatment of acute complications  Description: List symptoms of hyper- and hypoglycemia; describe how to treat low blood sugar and actions for lowering  high blood glucose level. Outcome: Progressing Towards Goal  Goal: *Prevention, detection and treatment of chronic complications  Description: Define the natural course of diabetes and describe the relationship of blood glucose levels to long term complications of diabetes.   Outcome: Progressing Towards Goal  Goal: *Developing strategies to address psychosocial issues  Description: Describe feelings about living with diabetes; identify support needed and support network  Outcome: Progressing Towards Goal  Goal: *Insulin pump training  Outcome: Progressing Towards Goal  Goal: *Sick day guidelines  Outcome: Progressing Towards Goal  Goal: *Patient Specific Goal (EDIT GOAL, INSERT TEXT)  Outcome: Progressing Towards Goal     Problem: Patient Education: Go to Patient Education Activity  Goal: Patient/Family Education  Outcome: Progressing Towards Goal     Problem: Falls - Risk of  Goal: *Absence of Falls  Description: Document Rafaela Watson Fall Risk and appropriate interventions in the flowsheet. Outcome: Progressing Towards Goal  Note: Fall Risk Interventions:  Mobility Interventions: Patient to call before getting OOB         Medication Interventions: Patient to call before getting OOB    Elimination Interventions: Call light in reach              Problem: Patient Education: Go to Patient Education Activity  Goal: Patient/Family Education  Outcome: Progressing Towards Goal     Problem: Discharge Planning  Goal: *Discharge to safe environment  Outcome: Progressing Towards Goal     Problem: Pain  Goal: *Control of Pain  Outcome: Progressing Towards Goal  Goal: *PALLIATIVE CARE:  Alleviation of Pain  Outcome: Progressing Towards Goal     Problem: Patient Education: Go to Patient Education Activity  Goal: Patient/Family Education  Outcome: Progressing Towards Goal     Problem: Pressure Injury - Risk of  Goal: *Prevention of pressure injury  Description: Document Erickson Scale and appropriate interventions in the flowsheet. Outcome: Progressing Towards Goal  Note: Pressure Injury Interventions:             Activity Interventions: Pressure redistribution bed/mattress(bed type)    Mobility Interventions: Pressure redistribution bed/mattress (bed type)    Nutrition Interventions: Document food/fluid/supplement intake                     Problem: Patient Education: Go to Patient Education Activity  Goal: Patient/Family Education  Outcome: Progressing Towards Goal

## 2021-01-19 NOTE — PROGRESS NOTES
Bedside shift change report given to Jin Martins RN (oncoming nurse) by Sweta Dillard RN (offgoing nurse). Report included the following information SBAR, Kardex and Recent Results. 0910 Pain medication given. Patient resting in bed at this time. Bedside shift change report given to Mona Tubbs (oncoming nurse) by Jin Martins RN (offgoing nurse). Report included the following information SBAR, Kardex, MAR and Recent Results.

## 2021-01-19 NOTE — PROGRESS NOTES
Problem: Diabetes Self-Management  Goal: *Disease process and treatment process  Description: Define diabetes and identify own type of diabetes; list 3 options for treating diabetes. Outcome: Progressing Towards Goal  Goal: *Incorporating nutritional management into lifestyle  Description: Describe effect of type, amount and timing of food on blood glucose; list 3 methods for planning meals. Outcome: Progressing Towards Goal  Goal: *Incorporating physical activity into lifestyle  Description: State effect of exercise on blood glucose levels. Outcome: Progressing Towards Goal  Goal: *Developing strategies to promote health/change behavior  Description: Define the ABC's of diabetes; identify appropriate screenings, schedule and personal plan for screenings. Outcome: Progressing Towards Goal  Goal: *Using medications safely  Description: State effect of diabetes medications on diabetes; name diabetes medication taking, action and side effects. Outcome: Progressing Towards Goal  Goal: *Monitoring blood glucose, interpreting and using results  Description: Identify recommended blood glucose targets  and personal targets. Outcome: Progressing Towards Goal  Goal: *Prevention, detection, treatment of acute complications  Description: List symptoms of hyper- and hypoglycemia; describe how to treat low blood sugar and actions for lowering  high blood glucose level. Outcome: Progressing Towards Goal  Goal: *Prevention, detection and treatment of chronic complications  Description: Define the natural course of diabetes and describe the relationship of blood glucose levels to long term complications of diabetes.   Outcome: Progressing Towards Goal  Goal: *Developing strategies to address psychosocial issues  Description: Describe feelings about living with diabetes; identify support needed and support network  Outcome: Progressing Towards Goal  Goal: *Insulin pump training  Outcome: Progressing Towards Goal  Goal: *Sick day guidelines  Outcome: Progressing Towards Goal  Goal: *Patient Specific Goal (EDIT GOAL, INSERT TEXT)  Outcome: Progressing Towards Goal     Problem: Patient Education: Go to Patient Education Activity  Goal: Patient/Family Education  Outcome: Progressing Towards Goal     Problem: Falls - Risk of  Goal: *Absence of Falls  Description: Document Hughbony Goodwin Fall Risk and appropriate interventions in the flowsheet. Outcome: Progressing Towards Goal  Note: Fall Risk Interventions:  Mobility Interventions: Patient to call before getting OOB         Medication Interventions: Teach patient to arise slowly    Elimination Interventions: Toileting schedule/hourly rounds              Problem: Patient Education: Go to Patient Education Activity  Goal: Patient/Family Education  Outcome: Progressing Towards Goal     Problem: Discharge Planning  Goal: *Discharge to safe environment  Outcome: Progressing Towards Goal     Problem: Pain  Goal: *Control of Pain  Outcome: Progressing Towards Goal  Goal: *PALLIATIVE CARE:  Alleviation of Pain  Outcome: Progressing Towards Goal     Problem: Patient Education: Go to Patient Education Activity  Goal: Patient/Family Education  Outcome: Progressing Towards Goal     Problem: Pressure Injury - Risk of  Goal: *Prevention of pressure injury  Description: Document Erickson Scale and appropriate interventions in the flowsheet. Outcome: Progressing Towards Goal  Note: Pressure Injury Interventions:             Activity Interventions: Increase time out of bed    Mobility Interventions: HOB 30 degrees or less    Nutrition Interventions: Document food/fluid/supplement intake    Friction and Shear Interventions: HOB 30 degrees or less                Problem: Patient Education: Go to Patient Education Activity  Goal: Patient/Family Education  Outcome: Progressing Towards Goal

## 2021-01-19 NOTE — PROGRESS NOTES
Progress Note      Patient: Mckenzie Reasoner               Sex: male          DOA: 1/9/2021       YOB: 1965      Age:  54 y.o.        LOS:  LOS: 10 days             CHIEF COMPLAINT:  Sepsis, improving    Subjective:     Patient is awake and interactive  No distress    Objective:      Visit Vitals  BP (!) 139/92   Pulse 94   Temp 97.8 °F (36.6 °C)   Resp 20   Ht 5' 7\" (1.702 m)   Wt 91.6 kg (202 lb)   SpO2 92%   BMI 31.64 kg/m²       Physical Exam:  Gen:  No distress, no complaint  Lungs:  Clear bilaterally, no wheeze or rhonchi  Heart:  Regular rate and rhythm, no murmurs or gallops  Abdomen:  Soft, non-tender, normal bowel sounds        Lab/Data Reviewed:  BMP:   Lab Results   Component Value Date/Time     (L) 01/19/2021 04:50 AM    K 4.4 01/19/2021 04:50 AM     01/19/2021 04:50 AM    CO2 28 01/19/2021 04:50 AM    AGAP 6 01/19/2021 04:50 AM    GLU 86 01/19/2021 04:50 AM    BUN 10 01/19/2021 04:50 AM    CREA 0.77 01/19/2021 04:50 AM    GFRAA >60 01/19/2021 04:50 AM    GFRNA >60 01/19/2021 04:50 AM         Assessment/Plan     Principal Problem:    Septic shock ([address] (1/9/2021)    Active Problems:    PICC line infection (1/9/2021)      Type 2 diabetes mellitus (HCC) ()      Schizoaffective disorder, bipolar type (HCC) ()      Hyperlipidemia ()      Infected hardware in right leg (HCC) ()      Lactic acidosis ()      Acute renal failure (ARF) ([address] (1/9/2021)      Hypomagnesemia (1/9/2021)        Plan:  Patient has PICC  Continuing antibiotics  BS control  Anticipate return to [facility].

## 2021-01-19 NOTE — PROGRESS NOTES
attempted to complete a follow up visit with and Spiritual assessment of patient in room 2215 today but found him resting peacefully in a very dark room and unable to communicate now.  . Chaplains will continue to follow and will provide pastoral care on an as needed/requested basis    Chaplain Alfredo Trevizo   Board Certified [address] [place] [address]   [phone]

## 2021-01-20 VITALS
BODY MASS INDEX: 29.11 KG/M2 | SYSTOLIC BLOOD PRESSURE: 130 MMHG | TEMPERATURE: 97.9 F | DIASTOLIC BLOOD PRESSURE: 82 MMHG | HEART RATE: 93 BPM | OXYGEN SATURATION: 95 % | RESPIRATION RATE: 20 BRPM | HEIGHT: 67 IN | WEIGHT: 185.5 LBS

## 2021-01-20 LAB
ALBUMIN SERPL-MCNC: 2.6 G/DL (ref 3.4–5)
ANION GAP SERPL CALC-SCNC: 7 MMOL/L (ref 3–18)
BASOPHILS # BLD: 0 K/UL (ref 0–0.1)
BASOPHILS NFR BLD: 0 % (ref 0–2)
BUN SERPL-MCNC: 11 MG/DL (ref 7–18)
BUN/CREAT SERPL: 14 (ref 12–20)
CALCIUM SERPL-MCNC: 8.7 MG/DL (ref 8.5–10.1)
CHLORIDE SERPL-SCNC: 100 MMOL/L (ref 100–111)
CO2 SERPL-SCNC: 27 MMOL/L (ref 21–32)
CREAT SERPL-MCNC: 0.77 MG/DL (ref 0.6–1.3)
DIFFERENTIAL METHOD BLD: ABNORMAL
EOSINOPHIL # BLD: 0.2 K/UL (ref 0–0.4)
EOSINOPHIL NFR BLD: 3 % (ref 0–5)
ERYTHROCYTE [DISTWIDTH] IN BLOOD BY AUTOMATED COUNT: 15.5 % (ref 11.6–14.5)
GLUCOSE BLD STRIP.AUTO-MCNC: 103 MG/DL (ref 70–110)
GLUCOSE BLD STRIP.AUTO-MCNC: 125 MG/DL (ref 70–110)
GLUCOSE SERPL-MCNC: 97 MG/DL (ref 74–99)
HCT VFR BLD AUTO: 35.6 % (ref 36–48)
HGB BLD-MCNC: 11 G/DL (ref 13–16)
LYMPHOCYTES # BLD: 2.8 K/UL (ref 0.9–3.6)
LYMPHOCYTES NFR BLD: 39 % (ref 21–52)
MCH RBC QN AUTO: 27.6 PG (ref 24–34)
MCHC RBC AUTO-ENTMCNC: 30.9 G/DL (ref 31–37)
MCV RBC AUTO: 89.2 FL (ref 74–97)
MONOCYTES # BLD: 0.7 K/UL (ref 0.05–1.2)
MONOCYTES NFR BLD: 10 % (ref 3–10)
NEUTS SEG # BLD: 3.5 K/UL (ref 1.8–8)
NEUTS SEG NFR BLD: 48 % (ref 40–73)
PHOSPHATE SERPL-MCNC: 4.2 MG/DL (ref 2.5–4.9)
PLATELET # BLD AUTO: 370 K/UL (ref 135–420)
PMV BLD AUTO: 10.5 FL (ref 9.2–11.8)
POTASSIUM SERPL-SCNC: 4.9 MMOL/L (ref 3.5–5.5)
RBC # BLD AUTO: 3.99 M/UL (ref 4.7–5.5)
SODIUM SERPL-SCNC: 134 MMOL/L (ref 136–145)
WBC # BLD AUTO: 7.2 K/UL (ref 4.6–13.2)

## 2021-01-20 PROCEDURE — 74011250636 HC RX REV CODE- 250/636: Performed by: INTERNAL MEDICINE

## 2021-01-20 PROCEDURE — 74011250637 HC RX REV CODE- 250/637: Performed by: INTERNAL MEDICINE

## 2021-01-20 PROCEDURE — 36415 COLL VENOUS BLD VENIPUNCTURE: CPT

## 2021-01-20 PROCEDURE — 80069 RENAL FUNCTION PANEL: CPT

## 2021-01-20 PROCEDURE — 85025 COMPLETE CBC W/AUTO DIFF WBC: CPT

## 2021-01-20 PROCEDURE — 82962 GLUCOSE BLOOD TEST: CPT

## 2021-01-20 PROCEDURE — 74011250637 HC RX REV CODE- 250/637: Performed by: HOSPITALIST

## 2021-01-20 PROCEDURE — 2709999900 HC NON-CHARGEABLE SUPPLY

## 2021-01-20 RX ORDER — OXYCODONE AND ACETAMINOPHEN 5; 325 MG/1; MG/1
1 TABLET ORAL
Qty: 10 TAB | Refills: 0 | Status: SHIPPED | OUTPATIENT
Start: 2021-01-20 | End: 2021-01-23

## 2021-01-20 RX ADMIN — OXYCODONE AND ACETAMINOPHEN 1 TABLET: 5; 325 TABLET ORAL at 08:55

## 2021-01-20 RX ADMIN — ENOXAPARIN SODIUM 90 MG: 100 INJECTION SUBCUTANEOUS at 01:05

## 2021-01-20 RX ADMIN — QUETIAPINE FUMARATE 400 MG: 200 TABLET ORAL at 10:03

## 2021-01-20 RX ADMIN — FAMOTIDINE 20 MG: 20 TABLET ORAL at 08:52

## 2021-01-20 RX ADMIN — SODIUM CHLORIDE 10 ML: 9 INJECTION, SOLUTION INTRAMUSCULAR; INTRAVENOUS; SUBCUTANEOUS at 06:00

## 2021-01-20 RX ADMIN — FLUOXETINE 80 MG: 20 CAPSULE ORAL at 08:52
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
## 2021-01-20 NOTE — ROUTINE PROCESS
0730:  Bedside and Verbal shift change report given to Alirio Bella (oncoming nurse) by Freya Land RN (offgoing nurse). Report included the following information SBAR, Kardex, MAR and Recent Results. 1767:  Administered due meds. Call placed to pharmacy regarding seroquel not being in the specific bin/pyxis. Rechecked according to pharmacy information about location of medication. After rechecking, medication bin remains empty. 1003:  Administered Seroquel, patient sitting on side of bed, re-taped IV. 
 
1011:  Patient noted with D/C orders back to [facility]. CM placed papers chart at nursing station. 1235:  Called report to [facility]. Transport should arrive within the hour. 1325:  Patient left via stretcher with Transport to [facility]. IV line removed, Long term line remained patent and in place. All patient belongings left with patient including ID and debit card.

## 2021-01-20 NOTE — DISCHARGE SUMMARY
Discharge Summary    Patient: Afia Weber               Sex: male          DOA: 1/9/2021         YOB: 1965      Age:  54 y.o.        LOS:  LOS: 11 days                Admit Date: 1/9/2021    Discharge Date: 1/20/2021    Admission Diagnoses: Septic shock ([facility] [A41.9, R65.21]  PICC line infection [T80.219A]    Discharge Diagnoses:    Problem List as of 1/20/2021 Never Reviewed          Codes Class Noted - Resolved    PICC line infection ICD-10-CM: T80.219A  ICD-9-CM: 999.31  1/9/2021 - Present        * (Principal) Septic shock ([facility] ICD-10-CM: A41.9, R65.21  ICD-9-CM: 038.9, 785.52, 995.92  1/9/2021 - Present        Type 2 diabetes mellitus ([facility] ICD-10-CM: E11.9  ICD-9-CM: 250.00  Unknown - Present        Schizoaffective disorder, bipolar type ([facility] ICD-10-CM: F25.0  ICD-9-CM: 295.70  Unknown - Present        Hyperlipidemia ICD-10-CM: E78.5  ICD-9-CM: 272.4  Unknown - Present        Infected hardware in right leg ([facility] ICD-10-CM: T84. 7XXA  ICD-9-CM: 996.67  Unknown - Present        Lactic acidosis ICD-10-CM: E87.2  ICD-9-CM: 276.2  Unknown - Present        Acute renal failure (ARF) ([facility]) ICD-10-CM: N17.9  ICD-9-CM: 584.9  1/9/2021 - Present        Hypomagnesemia ICD-10-CM: B18.33  ICD-9-CM: 275.2  1/9/2021 - Present              Discharge Condition:  Improved    Hospital Course:  Mr Brandon Danielle is a 54year old male who previously had right tibial hardware placed that subsequently became infected and was removed at [facility] prior to coming to [facility]. When he presented to the ER he was in septic shock and required IV vasopressor. He was admitted to the ICU for ongoing management. Mr Brandon Danielle was treated with broad spectrum antibiotics and vasopressors were utilized. Infectious Disease was consulted for direction on antibiotics management. Ultimately his blood culture revealed:  Radha dubliniensis. He was begun on Eraxis which needs to continue until 1/27/21.   He received his full course of intended Rocephin for his previous hardware infection and Rocephin was discontinued by ID. Mr Bar Luna had acute renal failure because of his septic shock. Nephrology was consulted and gradually his renal function has normalized. From a Nutrition perspective, Mr Bar Luna has previous diabetes, but he has not required insulin therapy recently and has controlled blood sugars. This is attributed to improved diet in the hospital, but his blood sugar needs to be followed after discharge. At this point, he is ready to leave the hospital for SNF care. He can transfer later today. The most important detail is continuing his Eraxis until 1/27/21 and then discontinuing his PICC line after therapy is complete. Consults:   Infectious Disease, Nephrology, Critical Care   Consulting Provider: Elier Castro MD   Consulting Provider: Arnold Alexander MD   Consulting Provider: Rober Goldberg, MD    Significant Diagnostic Studies:     Discharge Medications:     Current Discharge Medication List      START taking these medications    Details   oxyCODONE-acetaminophen (PERCOCET) 5-325 mg per tablet Take 1 Tab by mouth every six (6) hours as needed for Pain for up to 3 days. Max Daily Amount: 4 Tabs. Qty: 10 Tab, Refills: 0    Associated Diagnoses: Infected hardware in right lower extremity, subsequent encounter      anidulafungin 100 mg 100 mg by IntraVENous route every twenty-four (24) hours for 7 days. Qty: 8 Dose, Refills: 0         CONTINUE these medications which have NOT CHANGED    Details   acetaminophen (TylenoL) 325 mg tablet Take 650 mg by mouth every six (6) hours as needed for Pain. docusate sodium (COLACE) 100 mg capsule Take 100 mg by mouth nightly. atorvastatin (Lipitor) 80 mg tablet Take 80 mg by mouth daily. lisinopriL (PRINIVIL, ZESTRIL) 5 mg tablet Take  by mouth daily. enoxaparin (Lovenox) 40 mg/0.4 mL 40 mg by SubCUTAneous route daily.       gabapentin (Neurontin) 800 mg tablet Take 800 mg by mouth four (4) times daily. pantoprazole (Protonix) 40 mg granules for oral suspension Take 40 mg by mouth two (2) times a day. FLUoxetine (PROzac) 40 mg capsule Take 80 mg by mouth daily. QUEtiapine (SEROqueL) 400 mg tablet Take 400 mg by mouth every twelve (12) hours.          STOP taking these medications       insulin lispro (HUMALOG) 100 unit/mL injection Comments:   Reason for Stopping:         cefTRIAXone (ROCEPHIN) 1 gram injection Comments:   Reason for Stopping:         insulin lispro (HUMALOG) 100 unit/mL kwikpen Comments:   Reason for Stopping:         insulin glargine (Lantus Solostar U-100 Insulin) 100 unit/mL (3 mL) inpn Comments:   Reason for Stopping:         oxyCODONE IR (ROXICODONE) 5 mg immediate release tablet Comments:   Reason for Stopping:               Activity: Activity as tolerated    Diet: Diabetic Diet    Wound Care: None needed    Follow-up: Follow up with staff MD on arrival.

## 2021-01-20 NOTE — PROGRESS NOTES
Problem: Discharge Planning  Goal: *Discharge to safe environment  Outcome: resolved/met   [facility]    [facility] Dc summary placed in envelope. Pcs, envelope in nurses station. Notified pt transport at 1pm. Notified frank. Care Management Interventions  PCP Verified by CM: No  Palliative Care Criteria Met (RRAT>21 & CHF Dx)?: No  Mode of Transport at Discharge:  Other (see comment)  Transition of Care Consult (CM Consult): SNF  Partner SNF: Yes  Discharge Durable Medical Equipment: No  Physical Therapy Consult: No  Occupational Therapy Consult: No  Speech Therapy Consult: No  Current Support Network: Nursing Facility  Confirm Follow Up Transport: Other (see comment)  The Patient and/or Patient Representative was Provided with a Choice of Provider and Agrees with the Discharge Plan?: No  Freedom of Choice List was Provided with Basic Dialogue that Supports the Patient's Individualized Plan of Care/Goals, Treatment Preferences and Shares the Quality Data Associated with the Providers?: No  Discharge Location  Discharge Placement: Skilled nursing facility

## 2021-01-20 NOTE — PROGRESS NOTES
1930    Bedside, Verbal, and Written shift change report given to [address] [place] [address] (oncoming nurse) by derrick ANNE (offgoing nurse). Report included the following information SBAR, Kardex, and MAR.     2000  Patient is in bed, alert and oriented x 4. Room air, IV CDI, dressing CDI, no sign of distress. SCD contraindicated. Patient repositioned. Bed low, call bell within reach. 2051  Patient requested pain medication. 0130  Patient is in bed, sleeping. No sign of distress. Bed low, call bell within reach. 0500  Patient offered assistance with hygiene. Refused. 0600 Trash emptied, given water. Patient repositioned. No sign of distress. 0700  Patient is in bed resting/ sleeping. Patient has no complaint. 0730  Bedside, Verbal, and Written shift change report given to Domingo BANG (oncoming nurse) by [address] [place] [address] (offgoing nurse). Report included the following information SBAR, Kardex, and MAR.

## 2021-01-20 NOTE — ROUTINE PROCESS
Bedside and Verbal shift change report given to Sam Estrella RN (oncoming nurse) by Clifton Jackson RN, BSN (offgoing nurse). Report included the following information SBAR, Kardex, ED Summary, Intake/Output, MAR and Recent Results.   
 
Clifton Jackson RN, BSN
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
